# Patient Record
Sex: MALE | Race: WHITE | Employment: OTHER | URBAN - METROPOLITAN AREA
[De-identification: names, ages, dates, MRNs, and addresses within clinical notes are randomized per-mention and may not be internally consistent; named-entity substitution may affect disease eponyms.]

---

## 2022-06-13 PROBLEM — H54.7 POOR VISION: Status: ACTIVE | Noted: 2022-06-13

## 2022-06-13 PROBLEM — F17.200 CURRENT SMOKER: Status: ACTIVE | Noted: 2022-06-13

## 2022-06-13 PROBLEM — E78.5 HYPERLIPIDEMIA: Status: ACTIVE | Noted: 2022-06-13

## 2022-06-13 PROBLEM — N40.0 ENLARGED PROSTATE: Status: ACTIVE | Noted: 2022-06-13

## 2022-06-13 PROBLEM — I10 ESSENTIAL HYPERTENSION: Status: ACTIVE | Noted: 2022-06-13

## 2022-06-14 PROBLEM — R97.20 ELEVATED PSA, BETWEEN 10 AND LESS THAN 20 NG/ML: Status: ACTIVE | Noted: 2022-06-14

## 2022-06-14 PROBLEM — R97.20 ELEVATED PSA, BETWEEN 10 AND LESS THAN 20 NG/ML: Chronic | Status: ACTIVE | Noted: 2022-06-14

## 2022-09-13 PROBLEM — Z12.11 COLON CANCER SCREENING: Status: ACTIVE | Noted: 2022-09-13

## 2022-09-13 PROBLEM — H91.91 HEARING LOSS OF RIGHT EAR: Status: ACTIVE | Noted: 2022-09-13

## 2022-09-13 PROBLEM — R97.20 ELEVATED PSA: Status: ACTIVE | Noted: 2022-06-14

## 2022-09-21 PROBLEM — N40.3 NODULAR PROSTATE WITH URINARY OBSTRUCTION: Status: ACTIVE | Noted: 2022-06-13

## 2022-09-21 PROBLEM — N13.8 NODULAR PROSTATE WITH URINARY OBSTRUCTION: Status: ACTIVE | Noted: 2022-06-13

## 2022-10-13 PROBLEM — Z12.11 COLON CANCER SCREENING: Status: RESOLVED | Noted: 2022-09-13 | Resolved: 2022-10-13

## 2022-12-14 RX ORDER — CIPROFLOXACIN 500 MG/1
500 TABLET ORAL 2 TIMES DAILY
Qty: 20 TABLET | Refills: 0 | Status: SHIPPED | OUTPATIENT
Start: 2022-12-14 | End: 2022-12-24

## 2022-12-15 RX ORDER — OMEPRAZOLE 40 MG/1
40 CAPSULE, DELAYED RELEASE ORAL DAILY
COMMUNITY

## 2022-12-19 NOTE — DISCHARGE INSTRUCTIONS
Prostate Biopsy: After Care    What can I expect after a prostate biopsy? After the biopsy it is normal to experience the following sensations or symptoms:    Burning with urination - It is normal to feel burning with urination for the first 24 hours after the biopsy. It may continue for up to three days. Frequent urination - This will gradually improve over the first 24 to 36 hours. Blood in the urine - It is normal to have slightly red tinged urine or urine that resembles a jeanine or red wine color. This may last from 12 hours to 3 weeks after the biopsy. Blood in stool - You may notice red stains on the toilet tissue or see some bloody streaks in your stool. This may last for up to 5 days. Blood in the semen - this may persist for up to 6 weeks after your biopsy. MEDICATIONS:   You may have a prescription for an antibiotic. You will need to pick that up from the pharmacy and take it as directed. Typically, Dr. Melissa Rolle likes you to start taking this the day before your procedure. If you have problems tolerating the medication, please call the office to let us know. Hold any aspirin or blood thinners as directed. The surgery scheduler should have reviewed this with you when you were scheduled for your biopsy. If you have questions, give her a call at [phone]. NSAID's like ibuprofen can also cause additional bleeding. Use Tylenol only for pain. How should I care for myself after the biopsy? Drink plenty of fluids to prevent blood clots and infection in the bladder. It is important that you hydrate well. Avoid strenuous exercise such as jogging, heavy lifting, golfing, and bike riding for at least 7 days. Take your antibiotics as directed and complete the full dose given. Do not drink alcoholic beverages until after completing your antibiotics. Do not take aspirin and anti-inflammatory products such as Celebrex for 1 (one) week following prostate biopsy.   Use Tylenol for pain.  Avoid sexual activity for 7 days. When should I call my doctor? Call the office if you have any of the following signs and symptoms. Persistent urinary frequency or burning. Fever of 101 degrees or greater. Urine that is cherry-red or has clots in it. Or you are unable to urinate. Persistent heavy bleeding or clots lasting longer than 7 days.

## 2022-12-20 ENCOUNTER — HOSPITAL ENCOUNTER (OUTPATIENT)
Age: 67
Discharge: HOME | End: 2022-12-20
Attending: UROLOGY | Admitting: UROLOGY
Payer: MEDICARE

## 2022-12-20 VITALS
RESPIRATION RATE: 16 BRPM | TEMPERATURE: 98.8 F | HEIGHT: 67 IN | OXYGEN SATURATION: 96 % | BODY MASS INDEX: 25.11 KG/M2 | DIASTOLIC BLOOD PRESSURE: 61 MMHG | SYSTOLIC BLOOD PRESSURE: 114 MMHG | WEIGHT: 160 LBS | HEART RATE: 60 BPM

## 2022-12-20 PROBLEM — R97.20 ELEVATED PROSTATE SPECIFIC ANTIGEN (PSA): Status: ACTIVE | Noted: 2022-12-20

## 2022-12-20 PROCEDURE — 88305 TISSUE EXAM BY PATHOLOGIST: CPT

## 2022-12-20 PROCEDURE — 76872 US TRANSRECTAL: CPT | Performed by: UROLOGY

## 2022-12-20 PROCEDURE — 88342 IMHCHEM/IMCYTCHM 1ST ANTB: CPT

## 2022-12-20 PROCEDURE — 74011000250 HC RX REV CODE- 250: Performed by: UROLOGY

## 2022-12-20 PROCEDURE — 77030003666 HC NDL SPINAL BD -A: Performed by: UROLOGY

## 2022-12-20 PROCEDURE — 99153 MOD SED SAME PHYS/QHP EA: CPT | Performed by: UROLOGY

## 2022-12-20 PROCEDURE — 88344 IMHCHEM/IMCYTCHM EA MLT ANTB: CPT

## 2022-12-20 PROCEDURE — 55700 PR BIOPSY OF PROSTATE,NEEDLE/PUNCH: CPT | Performed by: UROLOGY

## 2022-12-20 PROCEDURE — 99152 MOD SED SAME PHYS/QHP 5/>YRS: CPT | Performed by: UROLOGY

## 2022-12-20 PROCEDURE — 2709999900 HC NON-CHARGEABLE SUPPLY: Performed by: UROLOGY

## 2022-12-20 PROCEDURE — 77030003481 HC NDL BIOP GUN BARD -B: Performed by: UROLOGY

## 2022-12-20 PROCEDURE — 76210000026 HC REC RM PH II 1 TO 1.5 HR: Performed by: UROLOGY

## 2022-12-20 PROCEDURE — 55700 PR PROSTATE NEEDLE BIOPSY ANY APPROACH: CPT | Performed by: UROLOGY

## 2022-12-20 PROCEDURE — 76010000138 HC OR TIME 0.5 TO 1 HR: Performed by: UROLOGY

## 2022-12-20 PROCEDURE — 74011250636 HC RX REV CODE- 250/636: Performed by: UROLOGY

## 2022-12-20 RX ORDER — LIDOCAINE HYDROCHLORIDE 10 MG/ML
INJECTION INFILTRATION; PERINEURAL AS NEEDED
Status: DISCONTINUED | OUTPATIENT
Start: 2022-12-20 | End: 2022-12-20 | Stop reason: HOSPADM

## 2022-12-20 RX ORDER — SODIUM CHLORIDE, SODIUM LACTATE, POTASSIUM CHLORIDE, CALCIUM CHLORIDE 600; 310; 30; 20 MG/100ML; MG/100ML; MG/100ML; MG/100ML
20 INJECTION, SOLUTION INTRAVENOUS CONTINUOUS
Status: DISCONTINUED | OUTPATIENT
Start: 2022-12-20 | End: 2022-12-20 | Stop reason: HOSPADM

## 2022-12-20 RX ORDER — MIDAZOLAM HYDROCHLORIDE 1 MG/ML
2 INJECTION, SOLUTION INTRAMUSCULAR; INTRAVENOUS ONCE
Status: COMPLETED | OUTPATIENT
Start: 2022-12-20 | End: 2022-12-20

## 2022-12-20 RX ORDER — MIDAZOLAM HYDROCHLORIDE 1 MG/ML
1 INJECTION, SOLUTION INTRAMUSCULAR; INTRAVENOUS ONCE
Status: COMPLETED | OUTPATIENT
Start: 2022-12-20 | End: 2022-12-20

## 2022-12-20 RX ORDER — FENTANYL CITRATE 50 UG/ML
100 INJECTION, SOLUTION INTRAMUSCULAR; INTRAVENOUS ONCE
Status: COMPLETED | OUTPATIENT
Start: 2022-12-20 | End: 2022-12-20

## 2022-12-20 RX ADMIN — FENTANYL CITRATE 50 MCG: 50 INJECTION INTRAMUSCULAR; INTRAVENOUS at 10:14

## 2022-12-20 RX ADMIN — MIDAZOLAM 2 MG: 1 INJECTION INTRAMUSCULAR; INTRAVENOUS at 10:02

## 2022-12-20 RX ADMIN — MIDAZOLAM 1 MG: 1 INJECTION INTRAMUSCULAR; INTRAVENOUS at 10:02

## 2022-12-20 RX ADMIN — SODIUM CHLORIDE, POTASSIUM CHLORIDE, SODIUM LACTATE AND CALCIUM CHLORIDE 20 ML/HR: 600; 310; 30; 20 INJECTION, SOLUTION INTRAVENOUS at 09:29

## 2022-12-20 NOTE — PROGRESS NOTES
DC instructions reviewed with pt and  his brother,Gerson. Both verb understanding. Pt escorted out via wheelchair to front entrance for dc home.

## 2022-12-20 NOTE — H&P
UROLOGY HISTORY AND PHYSICAL  [place], [address] [facility] [employer] Office    Patient: Mckayla Gonzáles MRN: [id number]  SSN: [id number]    YOB: 1955  Age: 78 y.o. Sex: male          Date of Encounter:  December 20, 2022  Chief Complaint:  elevated PSA and prostate nodule on MRI             History of Present Illness:  Patient is a 78 y.o. male here for surgery. Prostate nodularity bilaterally on exam.  Started on tamsulosin. He reports urgency, frequency, and weak stream.  He has intermittency and straining. Nocturia x 2. Unclear if he has incomplete emptying. He drinks 5-6 cups of coffee per day and occasional tea. He notes tea makes him urinate more so he sticks with coffee. He does not drink water but not daily. PSA was 15.9. Alk phos 155. MRI 10/16/22: The prostate measures 6.2 x 5.3 x 4.8 cm. Prostate volume is 84.9 cc. Stromal  hypertrophy is a transition zone is noted. There are 2 possible lesions in the  prostate gland. Lesion 1:  Location: Posterior peripheral zone midline prostate base. Image number: Series 6 image 16  Size: 1.6 cm  T2 score: 5  DWI score: 5  DCE: Positive  PI-RADS: 5  MARISOL:  Lesion abuts the prostate capsule. No gross evidence of extra capsular  extension. Seminal Vesicle Invasion:  Questionable extension into the right seminal  vesicle. Lesion 2:  Location: Right anterior apex transitional zone.   Image number: Series 6 image 10  Size: 1.0 cm  T2 score: 3  DWI score: 4  DCE: Positive  PI-RADS: 3  MARISOL:  No definite extracapsular extension  Seminal Vesicle Invasion:  No definite seminal vesicle invasion  See the problem list.     Problem List  Date Reviewed: 10/26/2022            Codes Class Noted    Hearing loss of right ear ICD-10-CM: H91.91  ICD-9-CM: 389.9  9/13/2022        Elevated PSA ICD-10-CM: R97.20  ICD-9-CM: 790.93  6/14/2022    Overview Signed 9/21/2022  3:29 PM by Jerson Soto MD     Component Ref Range & Units 6/13/22 0911    Prostate Specific Ag 0.0 - 4.0 ng/mL 15.9 High                    Essential hypertension ICD-10-CM: I10  ICD-9-CM: 401.9  6/13/2022        Hyperlipidemia ICD-10-CM: E78.5  ICD-9-CM: 272.4  6/13/2022        Current smoker ICD-10-CM: F17.200  ICD-9-CM: 305.1  6/13/2022        Poor vision ICD-10-CM: H54.7  ICD-9-CM: 369.9  6/13/2022    Overview Signed 9/21/2022  3:55 PM by Raul Foster MD     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 He does not drive and cannot read easily. He still watches movies / [employer] as long as the scenes are brightly lit. Nodular prostate with urinary obstruction ICD-10-CM: N40.3, N13.8  ICD-9-CM: 600.11  6/13/2022            Past Medical History:  No Known Allergies   Prior to Admission medications    Medication Sig Start Date End Date Taking? Authorizing Provider   omeprazole (PRILOSEC) 40 mg capsule Take 40 mg by mouth daily. Yes Provider, Historical   losartan (COZAAR) 25 mg tablet Take 25 mg by mouth daily. 5/31/22  Yes Provider, Historical   atorvastatin (LIPITOR) 40 mg tablet Take 40 mg by mouth daily. 4/4/22  Yes Provider, Historical   ciprofloxacin HCl (CIPRO) 500 mg tablet Take 1 Tablet by mouth two (2) times a day for 10 days. Start the day before your biopsy. 12/14/22 12/24/22  Leodan Tam NP   tamsulosin (FLOMAX) 0.4 mg capsule Take 1 Capsule by mouth daily. Patient not taking: Reported on 12/15/2022 9/21/22   Raul Foster MD      PMHx:  has a past medical history of GERD (gastroesophageal reflux disease), Heart murmur, Hypercholesterolemia, and Hypertension. PSurgHx:  has a past surgical history that includes hx hernia repair. PSocHx:  reports that he has been smoking cigarettes. He has a 10.00 pack-year smoking history. He has never used smokeless tobacco. He reports current alcohol use of about 4.0 standard drinks per week.  He reports that he does not currently use drugs after having used the following drugs: Marijuana. FamHx:   Family History   Problem Relation Age of Onset    Cancer Mother     Esophageal Cancer Mother     Breast Cancer Sister      ROS:  Admission ROS by Britton Rasmussen MD from (Not on file) were reviewed with the patient and changes (other than per HPI) include: none. Physical Exam:            Vitals[de-identified]    No data recorded. Height 5' 7\" (1.702 m), weight 160 lb (72.6 kg). Estimated body mass index is 25.06 kg/m² as calculated from the following:    Height as of this encounter: 5' 7\" (1.702 m). Weight as of this encounter: 160 lb (72.6 kg). I&O's:    No intake/output data recorded. General Well developed, in NAD   Conjunctiva/Lids Normal without gross defects   Neck Supple without obvious, masses   Respiratory Effort Breathing easily, no audible wheezing, rhonchi, stridor   CV RRR   Abdomen / Flank Soft, non tender without guarding or rebound, without obvious masses, no CVA tenderness   Neurologic Grossly normal without focal deficits           Assessment/Plan:  Elevated PSA and prostate nodules on MRI. The patient was counseled on the risks, benefits and expected course of surgery. Surgical risk factors include concurrent diagnoses and PMH. Surgery has risks of bleeding, infection, injury, pain, death or other consequences. Perioperative medications and antibiotic use were discussed including the potential for reactions and side effects. Some specific risks of surgery were discussed as well.        MR fusion guided prostate biopsy    Signed By: Britton Rasmussen MD  - December 20, 2022

## 2022-12-20 NOTE — OP NOTES
Patient: Phuc Schmidt MRN: [id number]  SSN: [id number]    YOB: 1955  Age: 78 y.o. Sex: male              UROLOGY OPERATIVE NOTE    Pre-operative Diagnosis:  Elevated PSA, prostate lesion   Post-operative Diagnosis: same    Procedure:  Prostate needle biopsy, Transrectal ultrasound, US guidance for biopsy    Surgeon: Daina Dwyer MD  Assistant: none  Anesthesia:  18min moderate sedation  Versed (mg): 3  Fentanyl (mcg): 50  Local lidocaine (cc):  6    Findings:  TRUS volume: 91 cc/ MRI volume: 85 cc  MR localized lesions  Estimated Blood Loss:    scant  Drains:  none  Specimens:   Targeted biopsy location 1, PI-RADS 5: left mid peripheral 2 cores  Targeted biopsy location 2, PI-RADS 4: right ant apex TZ 2 cores  Right lateral base  Right lateral mid  Right lateral apex  Right medial base  Right medial mid  Rigth medial apex  Left lateral base  Left lateral mid  Left lateral apex  Left medial base  Left medial mid  Left medial apex    Complications: none           Procedure Details: The patient was seen in the pre-operative area. The risks, benefits, complications, alternative treatment options, and expected outcomes were again discussed with the patient. The possibilities of reaction to medication, pain, infection, bleeding, major cardiovascular event, death, damage to surrounding structures were specifically addressed. Informed consent was then obtained. Upon arrival to the operative suite, the patient, procedure, and side were confirmed via a pre-operative \"time-out\". All were in agreement. The patient was carefully positioned in the left lateral position and sedation was undertaken. Digital rectal exam was performed. No palpable nodules were identified. The transrectal ultrasound probe was introduced. Multiplanar echography was performed. 6 cc of 1% lidocaine was infiltrated at the right prostate apex and base and left prostate apex and base.      The prostate contours were mapped and fused with the MRI images. Fused ultrasound imaging was used to target the suspicious areas. Using a 18-gauge core biopsy needle, the MRI lesions were biopsied. Multiple cores, at least 3, were obtained at the suspicious lesions. Adequate tissue was obtained. Standard 12 zone prostate sampling was performed as well. Single cores were obtained unless otherwise specified. The ultrasound was reviewed. Hemostasis was satisfactory. The patient was transported in stable condition to recovery.     Leidy Haque MD

## 2022-12-21 ENCOUNTER — TELEPHONE (OUTPATIENT)
Dept: UROLOGY | Age: 67
End: 2022-12-21

## 2022-12-21 DIAGNOSIS — R97.20 ELEVATED PSA: Primary | ICD-10-CM

## 2022-12-21 RX ORDER — SULFAMETHOXAZOLE AND TRIMETHOPRIM 800; 160 MG/1; MG/1
1 TABLET ORAL 2 TIMES DAILY
Qty: 10 TABLET | Refills: 0 | Status: SHIPPED | OUTPATIENT
Start: 2022-12-21 | End: 2022-12-26

## 2022-12-24 ENCOUNTER — TELEPHONE (OUTPATIENT)
Dept: UROLOGY | Age: 67
End: 2022-12-24

## 2022-12-24 NOTE — TELEPHONE ENCOUNTER
Patient called and reported that he is unable to have a BM since the prostate biopsy on 12/20/22  He denied N/V, abdominal pain,  He was advised to eat soft diet, fruits and vegetables, drink a lot of water, take Doculax or Miralx. He will call back if above interventions will not help.

## 2023-01-04 ENCOUNTER — OFFICE VISIT (OUTPATIENT)
Dept: UROLOGY | Age: 68
End: 2023-01-04
Payer: MEDICARE

## 2023-01-04 VITALS — WEIGHT: 160 LBS | BODY MASS INDEX: 25.11 KG/M2 | HEIGHT: 67 IN

## 2023-01-04 DIAGNOSIS — N13.8 NODULAR PROSTATE WITH URINARY OBSTRUCTION: ICD-10-CM

## 2023-01-04 DIAGNOSIS — R97.20 ELEVATED PSA: ICD-10-CM

## 2023-01-04 DIAGNOSIS — N40.3 NODULAR PROSTATE WITH URINARY OBSTRUCTION: ICD-10-CM

## 2023-01-04 DIAGNOSIS — C61 PROSTATE CANCER (HCC): ICD-10-CM

## 2023-01-04 PROCEDURE — G8536 NO DOC ELDER MAL SCRN: HCPCS | Performed by: UROLOGY

## 2023-01-04 PROCEDURE — G8427 DOCREV CUR MEDS BY ELIG CLIN: HCPCS | Performed by: UROLOGY

## 2023-01-04 PROCEDURE — 99214 OFFICE O/P EST MOD 30 MIN: CPT | Performed by: UROLOGY

## 2023-01-04 PROCEDURE — 3017F COLORECTAL CA SCREEN DOC REV: CPT | Performed by: UROLOGY

## 2023-01-04 PROCEDURE — 1123F ACP DISCUSS/DSCN MKR DOCD: CPT | Performed by: UROLOGY

## 2023-01-04 PROCEDURE — G8417 CALC BMI ABV UP PARAM F/U: HCPCS | Performed by: UROLOGY

## 2023-01-04 PROCEDURE — 1101F PT FALLS ASSESS-DOCD LE1/YR: CPT | Performed by: UROLOGY

## 2023-01-04 PROCEDURE — G8432 DEP SCR NOT DOC, RNG: HCPCS | Performed by: UROLOGY

## 2023-01-04 NOTE — PROGRESS NOTES
HISTORY OF PRESENT ILLNESS  Loyda Mann is a 78 y.o. male. has a past medical history of GERD (gastroesophageal reflux disease), Heart murmur, Hypercholesterolemia, Hypertension, and Retina degeneration. has a past surgical history that includes hx hernia repair. Chief Complaint   Patient presents with    Post OP Follow Up    Biopsy Results     He is status post a prostate biopsy 12/20/2022 for PSA 15.9. MRI revealed several lesions. He underwent a biopsy which revealed Dasha's 3+3 disease in 1 of 14 cores. He also had atypia. Chronic Conditions Addressed Today       1. Nodular prostate with urinary obstruction     Overview      TRUS volume: 91 cc/ MRI volume: 85 cc in 2022          Current Assessment & Plan       Prostate enlargement without bothersome symptoms. 2. Elevated PSA     Overview      Component Ref Range & Units 6/13/22 0911    Prostate Specific Ag 0.0 - 4.0 ng/mL 15.9 High                 Current Assessment & Plan       He has a PSA 15.9 and underwent a MR fusion guided prostate biopsy. Biopsy results do not completely correlate to the elevation. Relevant Orders     PSA, DIAGNOSTIC (PROSTATE SPECIFIC AG)    3. Prostate cancer ([facility] [address]     Overview      PSA 15.9, status post MR fusion guided prostate biopsy 12/20/2022.  1 of 14 zones were positive for the [place]'s 3+3 disease and less than 5% of the tissue. Current Assessment & Plan      He is diagnosed with prostate cancer on biopsy 12/20/2022. PSA was 15.9. Dasha's 3+3 disease in 1 of 14 zones, less than 5% indicating low risk disease. I recommended decipher testing to confirm genetically low risk disease. His markedly elevated PSA does not correlate to the prostate cancer burden. This can make observation more confusing. Options include active surveillance, surgery, radiation, androgen deprivation, or other uncommon treatments. The patient was offered referral to radiation oncology.     We specifically discussed robotic prostatectomy with pelvic lymph node dissection. The risks include standard risks of anesthesia and surgery including bleeding, clots, injury, infection, death or other. The specific risks include incontinence, impotence, swelling or edema. The patient had the opportunity to ask questions. He is concerned about going through treatment. Plan on observation with PSA in 3m. Past Medical History:    PMHx (including negatives):  has a past medical history of GERD (gastroesophageal reflux disease), Heart murmur, Hypercholesterolemia, Hypertension, and Retina degeneration. PSurgHx:  has a past surgical history that includes hx hernia repair. PSocHx:  reports that he has been smoking cigarettes. He has a 10.00 pack-year smoking history. He has never used smokeless tobacco. He reports current alcohol use of about 4.0 standard drinks per week. He reports that he does not currently use drugs after having used the following drugs: Marijuana. FamilyHX:   Family History   Problem Relation Age of Onset    Cancer Mother     Esophageal Cancer Mother     Breast Cancer Sister      ROS  Physical Exam  No Known Allergies  Current Outpatient Medications   Medication Sig Dispense Refill    omeprazole (PRILOSEC) 40 mg capsule Take 40 mg by mouth daily. losartan (COZAAR) 25 mg tablet Take 25 mg by mouth daily. atorvastatin (LIPITOR) 40 mg tablet Take 40 mg by mouth daily.         Results for orders placed or performed in visit on 09/21/22   PSA, TOTAL &  FREE   Result Value Ref Range    Prostate Specific Ag 17.2 (H) 0.0 - 4.0 ng/mL    PSA, Free 3.83 N/A ng/mL    PSA, % Free 22.3 %   AMB POC URINALYSIS DIP STICK AUTO W/O MICRO   Result Value Ref Range    Color (UA POC) Yellow     Clarity (UA POC) Clear     Glucose (UA POC) Negative Negative mg/dL    Bilirubin (UA POC) Negative Negative    Ketones (UA POC) Negative Negative    Specific gravity (UA POC) 1.020 1.001 - 1.035 Blood (UA POC) Negative Negative    pH (UA POC) 6.5 4.6 - 8.0    Protein (UA POC) Negative Negative    Urobilinogen (UA POC) 0.2 mg/dL 0.2 - 1    Nitrites (UA POC) Negative Negative    Leukocyte esterase (UA POC) Negative Negative       ASSESSMENT and PLAN  Diagnoses and all orders for this visit:    1. Prostate cancer [facility])  Assessment & Plan:  He is diagnosed with prostate cancer on biopsy 12/20/2022. PSA was 15.9. [place]'s 3+3 disease in 1 of 14 zones, less than 5% indicating low risk disease. I recommended decipher testing to confirm genetically low risk disease. His markedly elevated PSA does not correlate to the prostate cancer burden. This can make observation more confusing. Options include active surveillance, surgery, radiation, androgen deprivation, or other uncommon treatments. The patient was offered referral to radiation oncology. We specifically discussed robotic prostatectomy with pelvic lymph node dissection. The risks include standard risks of anesthesia and surgery including bleeding, clots, injury, infection, death or other. The specific risks include incontinence, impotence, swelling or edema. The patient had the opportunity to ask questions. He is concerned about going through treatment. Plan on observation with PSA in 3m. 2. Elevated PSA  Assessment & Plan:   He has a PSA 15.9 and underwent a MR fusion guided prostate biopsy. Biopsy results do not completely correlate to the elevation. Orders:  -     PSA, DIAGNOSTIC (PROSTATE SPECIFIC AG); Future    3. Nodular prostate with urinary obstruction  Assessment & Plan:   Prostate enlargement without bothersome symptoms. Follow-up and Dispositions    Return in about 3 months (around 4/4/2023) for PSA prior.        Leonel Spann MD

## 2023-01-04 NOTE — LETTER
1/4/2023    Patient: Ever Wall   YOB: 1955   Date of Visit: 1/4/2023     Brenda Manrique MD  [place] [id number]  [address] [facility] [address]    Dear Brenda Manrique MD,      Thank you for referring Mr. Kaur Found to [facility] for evaluation. My notes for this consultation are attached. If you have questions, please do not hesitate to call me. I look forward to following your patient along with you.       Sincerely,    Dillon Greenberg MD

## 2023-01-04 NOTE — ASSESSMENT & PLAN NOTE
He is diagnosed with prostate cancer on biopsy 12/20/2022. PSA was 15.9. [place]'s 3+3 disease in 1 of 14 zones, less than 5% indicating low risk disease. I recommended decipher testing to confirm genetically low risk disease. His markedly elevated PSA does not correlate to the prostate cancer burden. This can make observation more confusing. Options include active surveillance, surgery, radiation, androgen deprivation, or other uncommon treatments. The patient was offered referral to radiation oncology. We specifically discussed robotic prostatectomy with pelvic lymph node dissection. The risks include standard risks of anesthesia and surgery including bleeding, clots, injury, infection, death or other. The specific risks include incontinence, impotence, swelling or edema. The patient had the opportunity to ask questions. He is concerned about going through treatment. Plan on observation with PSA in 3m.

## 2023-01-04 NOTE — ASSESSMENT & PLAN NOTE
He has a PSA 15.9 and underwent a MR fusion guided prostate biopsy. Biopsy results do not completely correlate to the elevation.

## 2023-01-04 NOTE — PROGRESS NOTES
Chief Complaint   Patient presents with    Post OP Follow Up    Biopsy Results     1. Have you been to the ER, urgent care clinic since your last visit? Hospitalized since your last visit? No    2. Have you seen or consulted any other health care providers outside of the [address] [place] [address] since your last visit? Include any pap smears or colon screening.  No  Visit Vitals  Ht 5' 7\" (1.702 m)   Wt 160 lb (72.6 kg)   BMI 25.06 kg/m²

## 2023-04-03 ENCOUNTER — TELEPHONE (OUTPATIENT)
Dept: UROLOGY | Age: 68
End: 2023-04-03

## 2023-04-05 ENCOUNTER — OFFICE VISIT (OUTPATIENT)
Dept: UROLOGY | Age: 68
End: 2023-04-05
Payer: MEDICARE

## 2023-04-05 ENCOUNTER — APPOINTMENT (OUTPATIENT)
Dept: GENERAL RADIOLOGY | Age: 68
End: 2023-04-05
Attending: EMERGENCY MEDICINE
Payer: MEDICARE

## 2023-04-05 ENCOUNTER — HOSPITAL ENCOUNTER (OUTPATIENT)
Age: 68
End: 2023-04-05
Attending: EMERGENCY MEDICINE
Payer: MEDICARE

## 2023-04-05 DIAGNOSIS — R97.20 ELEVATED PSA: ICD-10-CM

## 2023-04-05 DIAGNOSIS — C61 PROSTATE CANCER (HCC): Primary | ICD-10-CM

## 2023-04-05 PROCEDURE — 1101F PT FALLS ASSESS-DOCD LE1/YR: CPT | Performed by: UROLOGY

## 2023-04-05 PROCEDURE — 99283 EMERGENCY DEPT VISIT LOW MDM: CPT

## 2023-04-05 PROCEDURE — 1123F ACP DISCUSS/DSCN MKR DOCD: CPT | Performed by: UROLOGY

## 2023-04-05 PROCEDURE — G8536 NO DOC ELDER MAL SCRN: HCPCS | Performed by: UROLOGY

## 2023-04-05 PROCEDURE — G8417 CALC BMI ABV UP PARAM F/U: HCPCS | Performed by: UROLOGY

## 2023-04-05 PROCEDURE — 73030 X-RAY EXAM OF SHOULDER: CPT

## 2023-04-05 PROCEDURE — G8427 DOCREV CUR MEDS BY ELIG CLIN: HCPCS | Performed by: UROLOGY

## 2023-04-05 PROCEDURE — 99214 OFFICE O/P EST MOD 30 MIN: CPT | Performed by: UROLOGY

## 2023-04-05 PROCEDURE — 3017F COLORECTAL CA SCREEN DOC REV: CPT | Performed by: UROLOGY

## 2023-04-05 PROCEDURE — G8432 DEP SCR NOT DOC, RNG: HCPCS | Performed by: UROLOGY

## 2023-04-05 RX ORDER — ACETAMINOPHEN 500 MG
1000 TABLET ORAL
Status: DISCONTINUED
Start: 2023-04-05 | End: 2023-04-05 | Stop reason: HOSPADM

## 2023-04-05 RX ORDER — LIDOCAINE 50 MG/G
PATCH TOPICAL
Qty: 15 EACH | Refills: 0 | Status: SHIPPED
Start: 2023-04-05

## 2023-04-05 RX ORDER — NAPROXEN 500 MG/1
500 TABLET ORAL 2 TIMES DAILY WITH MEALS
Qty: 20 TABLET | Refills: 0 | Status: SHIPPED
Start: 2023-04-05 | End: 2023-04-15

## 2023-04-05 RX ORDER — LIDOCAINE 4 G/100G
3 PATCH TOPICAL
Status: DISCONTINUED
Start: 2023-04-05 | End: 2023-04-05 | Stop reason: HOSPADM

## 2023-04-05 NOTE — PROGRESS NOTES
Chief Complaint   Patient presents with    Follow-up    Prostate Cancer    Elevated PSA    Prostate Nodule       PHQ-9 score is    Negative    Vitals:    04/05/23 1358   Weight: 160 lb (72.6 kg)   Height: 5' 6\" (1.676 m)   PainSc:   0 - No pain      1. \"Have you been to the ER, urgent care clinic since your last visit? Hospitalized since your last visit? \" No    2. \"Have you seen or consulted any other health care providers outside of the [address] [place] [address] since your last visit? \" No     3. For patients aged 39-70: Has the patient had a colonoscopy / FIT/ Cologuard? No      If the patient is female:    4. For patients aged 41-77: Has the patient had a mammogram within the past 2 years? No      5. For patients aged 21-65: Has the patient had a pap smear?  No

## 2023-04-05 NOTE — PROGRESS NOTES
HISTORY OF PRESENT ILLNESS  Jessika Marie is a 80 y.o. male. has a past medical history of GERD (gastroesophageal reflux disease), Heart murmur, Hypercholesterolemia, Hypertension, and Retina degeneration. has a past surgical history that includes hx hernia repair and pr prostate biopsy, needle, saturation sampling (12/20/2022). Chief Complaint   Patient presents with    Follow-up    Prostate Cancer    Elevated PSA    Prostate Nodule     He has Welsh's 6 disease. PSA is due. The patient denies a weak urinary stream, sense of incomplete emptying, straining or hesitancy. The patient denies urinary frequency, urgency, dysuria or hematuria. Chronic Conditions Addressed Today       1. Nodular prostate with urinary obstruction     Overview      TRUS volume: 91 cc/ MRI volume: 85 cc in 2022          Current Assessment & Plan      No bothersome LUTS now. 2. Elevated PSA     Overview      Component Ref Range & Units 9/21/22 1555 6/13/22 0911   Prostate Specific Ag 0.0 - 4.0 ng/mL 17.2 High   15.9 High  CM                  Current Assessment & Plan      Observe PSA trends. Relevant Orders     PSA, DIAGNOSTIC (PROSTATE SPECIFIC AG)     PSA, DIAGNOSTIC (PROSTATE SPECIFIC AG)    3. Prostate cancer ([facility] [address] - Primary     Overview      PSA 15.9, status post MR fusion guided prostate biopsy 12/20/2022.  1 of 14 zones were positive for the [place]'s 3+3 disease and less than 5% of the tissue. Current Assessment & Plan      H/o elevated PSA. MR fusion biopsy 12/20/22 with 1/14 Gl 6 disease. Observation. PSA now. Relevant Orders     PSA, DIAGNOSTIC (PROSTATE SPECIFIC AG)     PSA, DIAGNOSTIC (PROSTATE SPECIFIC AG)       Past Medical History:    PMHx (including negatives):  has a past medical history of GERD (gastroesophageal reflux disease), Heart murmur, Hypercholesterolemia, Hypertension, and Retina degeneration.    PSurgHx:  has a past surgical history that includes hx hernia repair and pr prostate biopsy, needle, saturation sampling (12/20/2022). PSocHx:  reports that he has been smoking cigarettes. He has a 10.00 pack-year smoking history. He has never used smokeless tobacco. He reports current alcohol use of about 4.0 standard drinks per week. He reports that he does not currently use drugs after having used the following drugs: Marijuana. FamilyHX:   Family History   Problem Relation Age of Onset    Cancer Mother     Esophageal Cancer Mother     Breast Cancer Sister      ROS  Physical Exam  No Known Allergies  Current Outpatient Medications   Medication Sig Dispense Refill    omeprazole (PRILOSEC) 40 mg capsule Take 1 Capsule by mouth daily. losartan (COZAAR) 25 mg tablet Take 1 Tablet by mouth daily. atorvastatin (LIPITOR) 40 mg tablet Take 1 Tablet by mouth daily. Results for orders placed or performed in visit on 09/21/22   PSA, TOTAL &  FREE   Result Value Ref Range    Prostate Specific Ag 17.2 (H) 0.0 - 4.0 ng/mL    PSA, Free 3.83 N/A ng/mL    PSA, % Free 22.3 %   AMB POC URINALYSIS DIP STICK AUTO W/O MICRO   Result Value Ref Range    Color (UA POC) Yellow     Clarity (UA POC) Clear     Glucose (UA POC) Negative Negative mg/dL    Bilirubin (UA POC) Negative Negative    Ketones (UA POC) Negative Negative    Specific gravity (UA POC) 1.020 1.001 - 1.035    Blood (UA POC) Negative Negative    pH (UA POC) 6.5 4.6 - 8.0    Protein (UA POC) Negative Negative    Urobilinogen (UA POC) 0.2 mg/dL 0.2 - 1    Nitrites (UA POC) Negative Negative    Leukocyte esterase (UA POC) Negative Negative       ASSESSMENT and PLAN  Diagnoses and all orders for this visit:    1. Prostate cancer [facility])  Assessment & Plan:  H/o elevated PSA. MR fusion biopsy 12/20/22 with 1/14 Gl 6 disease. Observation. PSA now. Orders:  -     PSA, DIAGNOSTIC (PROSTATE SPECIFIC AG)  -     PSA, DIAGNOSTIC (PROSTATE SPECIFIC AG); Future    2. Elevated PSA  Assessment & Plan:  Observe PSA trends. Orders:  -     PSA, DIAGNOSTIC (PROSTATE SPECIFIC AG)  -     PSA, DIAGNOSTIC (PROSTATE SPECIFIC AG); Future    3. Nodular prostate with urinary obstruction  Assessment & Plan:  No bothersome LUTS now. Follow-up and Dispositions    Return in about 3 months (around 7/5/2023) for Follow up after testing. Jose Hernandez MD       Please note that portions of this note was potentially completed with Dragon dictation, the computer voice recognition software. Quite often unanticipated grammatical, syntax, homophones, and other interpretive errors are inadvertently transcribed by the computer software. Please disregard these errors. Please excuse any errors that have escaped final proofreading. Thank you.

## 2023-04-05 NOTE — ED TRIAGE NOTES
Patient reports left shoulder pain with difficulty raising it after falling out of a chair today and landing on it. Denies, hitting head, LOC or blood thinner use.

## 2023-04-05 NOTE — LETTER
4/5/2023    Patient: Bijan Holcomb   YOB: 1955   Date of Visit: 4/5/2023     Moi Segundo MD  [place] [id number]  [address] [facility] [address]    Dear Moi Segundo MD,      Thank you for referring Mr. Anabelle Cosby to [email or web address] for evaluation. My notes for this consultation are attached. If you have questions, please do not hesitate to call me. I look forward to following your patient along with you.       Sincerely,    Josue Truong MD

## 2023-04-06 NOTE — ED PROVIDER NOTES
72-year-old male with past medical history of GERD, heart murmur, elevated cholesterol, hypertension, retinal degeneration presents ambulatory for evaluation of left shoulder pain. Patient states that he was sitting in a chair earlier today, propped his feet up leaned back and tipped out of the chair, patient states that he landed directly on his left shoulder, denies hitting his head, loss of consciousness. Patient denies feeling dizzy or lightheaded currently, denies numbness or tingling, states that he is having difficulty moving his left arm at this time, no medications taken prior to arrival.  Patient states he takes a baby aspirin only. Patient states that he injured his left shoulder as a young child. 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 up by parasites somehow\"       Past Surgical History:   Procedure Laterality Date    HX HERNIA REPAIR      [facility] PROSTATE BIOPSY, NEEDLE, SATURATION SAMPLING  12/20/2022         Family History:   Problem Relation Age of Onset    Cancer Mother     Esophageal Cancer Mother     Breast Cancer Sister        Social History     Socioeconomic History    Marital status: SINGLE     Spouse name: Not on file    Number of children: Not on file    Years of education: Not on file    Highest education level: Not on file   Occupational History    Not on file   Tobacco Use    Smoking status: Every Day     Packs/day: 0.50     Years: 20.00     Pack years: 10.00     Types: Cigarettes    Smokeless tobacco: Never    Tobacco comments:     5-6 a day   Vaping Use    Vaping Use: Never used   Substance and Sexual Activity    Alcohol use:  Yes     Alcohol/week: 4.0 standard drinks     Types: 4 Cans of beer per week     Comment: occassionally    Drug use: Not Currently     Types: Marijuana     Comment: Marijuana 30 years ago Sexual activity: Not on file   Other Topics Concern    Not on file   Social History Narrative    Not on file     Social Determinants of Health     Financial Resource Strain: Not on file   Food Insecurity: Not on file   Transportation Needs: Not on file   Physical Activity: Not on file   Stress: Not on file   Social Connections: Not on file   Intimate Partner Violence: Not on file   Housing Stability: Not on file         ALLERGIES: Patient has no known allergies. Review of Systems   Constitutional: Negative. Respiratory: Negative. Cardiovascular: Negative. Gastrointestinal: Negative. Genitourinary: Negative. Musculoskeletal:  Positive for myalgias. Pain to the left shoulder. Neurological: Negative. Vitals:    04/05/23 1900   BP: (!) 153/77   Pulse: 79   Resp: 18   Temp: 98.2 °F (36.8 °C)   SpO2: 97%   Weight: 72.6 kg (160 lb)   Height: 5' 6\" (1.676 m)            Physical Exam  Vitals and nursing note reviewed. Constitutional:       General: He is not in acute distress. Appearance: Normal appearance. He is normal weight. He is not ill-appearing. HENT:      Head: Normocephalic. Nose: Nose normal.   Cardiovascular:      Rate and Rhythm: Normal rate. Pulses: Normal pulses. Pulmonary:      Effort: Pulmonary effort is normal. No respiratory distress. Abdominal:      General: There is no distension. Musculoskeletal:         General: No swelling. Right shoulder: Normal.      Left shoulder: Tenderness and bony tenderness present. No swelling, deformity or crepitus. Decreased range of motion. Normal pulse. Cervical back: Normal range of motion. Comments: Strong palpable left 2+ radial pulse, capillary refill less than 2 seconds, strength hand 5 out of 5, limited range of motion due to stiffness. Skin:     General: Skin is warm and dry. Capillary Refill: Capillary refill takes less than 2 seconds.    Neurological:      Mental Status: He is alert and oriented to person, place, and time. Psychiatric:         Mood and Affect: Mood normal.         Thought Content: Thought content normal.        Medical Decision Making  Differential dx: shoulder dislocation vs humerus fracture   1. Previous notes (external to Emergency room) were reviewed: chart review    2. History was obtained by: patient    3. Chronic medical issues affecting this visit:   none    4. I ordered the following tests, followed by review of final reads by lab and radiology: xray left shoulder    5. I considered ordering: none    6. Medical intervention: naprosyn, lidocaine patch      Surgical intervention: none indicated    7. Social determinants of health: none    8 Final diagnosis: acute pain of left shoulder. Medications prescribed: naprosyn, lidocaine patch    9. Disposition: Discharge to home and follow up with PCP, return to the emergency room with worsening symptoms. Patient in agreement with plan of care. Patient reevaluated, remains neurovascularly intact, discussed x-ray results with patient. X-ray left shoulder showing no acute bony abnormality. Amount and/or Complexity of Data Reviewed  Radiology: ordered. Risk  OTC drugs. Prescription drug management. , Discussed following up with orthopedics and PCP       Procedures  VITAL SIGNS:  Patient Vitals for the past 4 hrs:   Temp Pulse Resp BP SpO2   04/05/23 1900 98.2 °F (36.8 °C) 79 18 (!) 153/77 97 %           LABS:  The Following labs have been ordered while in the emergency department and I have independently evaluated them. No results found for this or any previous visit (from the past 6 hour(s)). IMAGING:  The Following imaging studies have been ordered while in the emergency department and I have reviewed them. XR SHOULDER LT AP/LAT MIN 2 V   Final Result   No acute bony abnormality.             Medications During Visit:  I ordered/approved the ordering of the following medicines for the patient while in the emergency department. Medications   lidocaine 4 % patch 3 Patch (has no administration in time range)   acetaminophen (TYLENOL) tablet 1,000 mg (1,000 mg Oral Refused 4/5/23 1933)       IMPRESSION:  1. Acute pain of left shoulder        DISPOSITION:  Discharged      Discharge Medication List as of 4/5/2023  8:46 PM        START taking these medications    Details   naproxen (Naprosyn) 500 mg tablet Take 1 Tablet by mouth two (2) times daily (with meals) for 10 days. , Normal, Disp-20 Tablet, R-0      lidocaine (LIDODERM) 5 % Apply patch to the affected area for 12 hours a day and remove for 12 hours a day., Normal, Disp-15 Each, R-0           CONTINUE these medications which have NOT CHANGED    Details   omeprazole (PRILOSEC) 40 mg capsule Take 1 Capsule by mouth daily. , Historical Med      losartan (COZAAR) 25 mg tablet Take 1 Tablet by mouth daily. , Historical Med      atorvastatin (LIPITOR) 40 mg tablet Take 1 Tablet by mouth daily. , Historical Med              Follow-up Information       Follow up With Specialties Details Why Contact Info    Stefany Gallagher MD [facility] Schedule an appointment as soon as possible for a visit in 3 days  [address] [facility]  [phone]      [facility]  Schedule an appointment as soon as possible for a visit   [address] [id number] [phone]    [facility] EMERGENCY DEPT Emergency Medicine  If symptoms worsen [address]  [phone]              The patient is asked to follow-up with their primary care provider in the next several days. They are to call tomorrow for an appointment. The patient is asked to return promptly for any increased concerns or worsening of symptoms. They can return to this emergency department or any other emergency department.     Chelsea Delgado NP  9:55 PM

## 2023-04-23 DIAGNOSIS — C61 PROSTATE CANCER (HCC): ICD-10-CM

## 2023-04-23 DIAGNOSIS — R97.20 ELEVATED PSA: Primary | ICD-10-CM

## 2023-06-30 ENCOUNTER — TELEPHONE (OUTPATIENT)
Age: 68
End: 2023-06-30

## 2023-07-03 PROBLEM — R97.20 ELEVATED PSA: Status: ACTIVE | Noted: 2022-06-14

## 2023-07-03 PROBLEM — C61 PROSTATE CANCER (HCC): Status: ACTIVE | Noted: 2023-01-04

## 2023-07-05 DIAGNOSIS — C61 PROSTATE CANCER (HCC): ICD-10-CM

## 2023-07-05 DIAGNOSIS — R97.20 ELEVATED PSA: ICD-10-CM

## 2023-07-19 ENCOUNTER — OFFICE VISIT (OUTPATIENT)
Age: 68
End: 2023-07-19
Payer: MEDICARE

## 2023-07-19 VITALS
HEIGHT: 67 IN | SYSTOLIC BLOOD PRESSURE: 118 MMHG | DIASTOLIC BLOOD PRESSURE: 72 MMHG | TEMPERATURE: 97.8 F | HEART RATE: 71 BPM | WEIGHT: 160 LBS | OXYGEN SATURATION: 100 % | BODY MASS INDEX: 25.11 KG/M2

## 2023-07-19 DIAGNOSIS — R97.20 ELEVATED PSA: ICD-10-CM

## 2023-07-19 DIAGNOSIS — C61 PROSTATE CANCER (HCC): Primary | ICD-10-CM

## 2023-07-19 DIAGNOSIS — C61 PROSTATE CANCER (HCC): ICD-10-CM

## 2023-07-19 PROCEDURE — 1123F ACP DISCUSS/DSCN MKR DOCD: CPT | Performed by: UROLOGY

## 2023-07-19 PROCEDURE — G8427 DOCREV CUR MEDS BY ELIG CLIN: HCPCS | Performed by: UROLOGY

## 2023-07-19 PROCEDURE — 3017F COLORECTAL CA SCREEN DOC REV: CPT | Performed by: UROLOGY

## 2023-07-19 PROCEDURE — 3078F DIAST BP <80 MM HG: CPT | Performed by: UROLOGY

## 2023-07-19 PROCEDURE — 4004F PT TOBACCO SCREEN RCVD TLK: CPT | Performed by: UROLOGY

## 2023-07-19 PROCEDURE — 99214 OFFICE O/P EST MOD 30 MIN: CPT | Performed by: UROLOGY

## 2023-07-19 PROCEDURE — 3074F SYST BP LT 130 MM HG: CPT | Performed by: UROLOGY

## 2023-07-19 PROCEDURE — G8419 CALC BMI OUT NRM PARAM NOF/U: HCPCS | Performed by: UROLOGY

## 2023-07-19 NOTE — ASSESSMENT & PLAN NOTE
H/o BPH and prostate cancer. Low risk but elevated PSA. Monitor PSA. Consider repeat MRI and biopsy at 1year timeframe.

## 2023-07-20 LAB — PSA SERPL-MCNC: 14.7 NG/ML (ref 0–4)

## 2023-09-27 ENCOUNTER — TELEPHONE (OUTPATIENT)
Age: 68
End: 2023-09-27

## 2023-09-27 DIAGNOSIS — C61 PROSTATE CANCER (HCC): Primary | ICD-10-CM

## 2023-09-27 NOTE — TELEPHONE ENCOUNTER
Patient is un clear of Dr Viktor Javier. He has a appt in January but lora note says 3 month f/u which would be October. He also has questions a bout a repeat MRI. Could you review chart and call patient back to answer his clinical questions.

## 2023-09-27 NOTE — TELEPHONE ENCOUNTER
PSA in 3 months time (end of October). Consider MRI at the 1 year willy, which would be around (10/16/23). Pt should have a 3 month follow up with KEYSHA Rondon prior. He will decide based on the PSA (which came down last time) if MRI is necessary at that time. PSA order is in. [facility] is fine for location. Can be a VV too.

## 2023-09-28 ENCOUNTER — TELEPHONE (OUTPATIENT)
Age: 68
End: 2023-09-28

## 2023-10-02 DIAGNOSIS — C61 PROSTATE CANCER (HCC): ICD-10-CM

## 2023-10-04 ENCOUNTER — OFFICE VISIT (OUTPATIENT)
Dept: PRIMARY CARE CLINIC | Facility: CLINIC | Age: 68
End: 2023-10-04
Payer: MEDICARE

## 2023-10-04 VITALS
WEIGHT: 161.6 LBS | HEART RATE: 83 BPM | BODY MASS INDEX: 25.97 KG/M2 | TEMPERATURE: 98.2 F | DIASTOLIC BLOOD PRESSURE: 69 MMHG | HEIGHT: 66 IN | SYSTOLIC BLOOD PRESSURE: 131 MMHG

## 2023-10-04 DIAGNOSIS — R42 DIZZINESS: Primary | ICD-10-CM

## 2023-10-04 DIAGNOSIS — I10 ESSENTIAL HYPERTENSION: ICD-10-CM

## 2023-10-04 PROCEDURE — G8419 CALC BMI OUT NRM PARAM NOF/U: HCPCS | Performed by: FAMILY MEDICINE

## 2023-10-04 PROCEDURE — 1123F ACP DISCUSS/DSCN MKR DOCD: CPT | Performed by: FAMILY MEDICINE

## 2023-10-04 PROCEDURE — 93000 ELECTROCARDIOGRAM COMPLETE: CPT | Performed by: FAMILY MEDICINE

## 2023-10-04 PROCEDURE — 3017F COLORECTAL CA SCREEN DOC REV: CPT | Performed by: FAMILY MEDICINE

## 2023-10-04 PROCEDURE — 3078F DIAST BP <80 MM HG: CPT | Performed by: FAMILY MEDICINE

## 2023-10-04 PROCEDURE — G8484 FLU IMMUNIZE NO ADMIN: HCPCS | Performed by: FAMILY MEDICINE

## 2023-10-04 PROCEDURE — 99214 OFFICE O/P EST MOD 30 MIN: CPT | Performed by: FAMILY MEDICINE

## 2023-10-04 PROCEDURE — 4004F PT TOBACCO SCREEN RCVD TLK: CPT | Performed by: FAMILY MEDICINE

## 2023-10-04 PROCEDURE — G8427 DOCREV CUR MEDS BY ELIG CLIN: HCPCS | Performed by: FAMILY MEDICINE

## 2023-10-04 PROCEDURE — 3075F SYST BP GE 130 - 139MM HG: CPT | Performed by: FAMILY MEDICINE

## 2023-10-04 RX ORDER — MECLIZINE HYDROCHLORIDE 25 MG/1
25 TABLET ORAL 3 TIMES DAILY PRN
Qty: 15 TABLET | Refills: 0 | Status: SHIPPED | OUTPATIENT
Start: 2023-10-04 | End: 2023-10-14

## 2023-10-04 SDOH — ECONOMIC STABILITY: FOOD INSECURITY: WITHIN THE PAST 12 MONTHS, THE FOOD YOU BOUGHT JUST DIDN'T LAST AND YOU DIDN'T HAVE MONEY TO GET MORE.: NEVER TRUE

## 2023-10-04 SDOH — ECONOMIC STABILITY: INCOME INSECURITY: HOW HARD IS IT FOR YOU TO PAY FOR THE VERY BASICS LIKE FOOD, HOUSING, MEDICAL CARE, AND HEATING?: NOT HARD AT ALL

## 2023-10-04 SDOH — ECONOMIC STABILITY: HOUSING INSECURITY
IN THE LAST 12 MONTHS, WAS THERE A TIME WHEN YOU DID NOT HAVE A STEADY PLACE TO SLEEP OR SLEPT IN A SHELTER (INCLUDING NOW)?: NO

## 2023-10-04 SDOH — ECONOMIC STABILITY: FOOD INSECURITY: WITHIN THE PAST 12 MONTHS, YOU WORRIED THAT YOUR FOOD WOULD RUN OUT BEFORE YOU GOT MONEY TO BUY MORE.: NEVER TRUE

## 2023-10-04 ASSESSMENT — PATIENT HEALTH QUESTIONNAIRE - PHQ9
SUM OF ALL RESPONSES TO PHQ QUESTIONS 1-9: 0
SUM OF ALL RESPONSES TO PHQ9 QUESTIONS 1 & 2: 0
2. FEELING DOWN, DEPRESSED OR HOPELESS: 0
SUM OF ALL RESPONSES TO PHQ QUESTIONS 1-9: 0
1. LITTLE INTEREST OR PLEASURE IN DOING THINGS: 0
SUM OF ALL RESPONSES TO PHQ QUESTIONS 1-9: 0
SUM OF ALL RESPONSES TO PHQ QUESTIONS 1-9: 0

## 2023-10-04 NOTE — PROGRESS NOTES
Identified Patient with two Patient identifiers(name and ). 1. Have you been to the ER, urgent care clinic since your last visit? Hospitalized since your last visit? No    2. Have you seen or consulted any other health care providers outside of the [address] [place] [address] since your last visit? No     3. For patients aged 43-73: Has the patient had a colonoscopy / FIT/ Cologuard? No    If the patient is female:    4. For patients aged 43-66: Has the patient had a mammogram within the past 2 years? No      5. For patients aged 21-65: Has the patient had a pap smear? No   There were no vitals taken for this visit. Chief Complaint   Patient presents with    Other     Feels dizzy not sure if his ear affecting it or if its his Bp. Cuts Bp meds in half. Health Maintenance Due   Topic Date Due    COVID-19 Vaccine (1) Never done    Pneumococcal 65+ years Vaccine (1 - PCV) Never done    DTaP/Tdap/Td vaccine (1 - Tdap) Never done    Diabetes screen  Never done    Colorectal Cancer Screen  Never done    Shingles vaccine (1 of 2) Never done    AAA screen  Never done    Lipids  2023    Flu vaccine (1) Never done    Depression Screen  2023    Annual Wellness Visit (AWV)  2023          No questionnaires available.

## 2023-10-04 NOTE — PROGRESS NOTES
Colette Peres (: 1955) is a 80 y.o. male, established patient, here for evaluation of the following chief complaint(s): Other (Feels dizzy not sure if his ear affecting it or if its his Bp. Cuts Bp meds in half. Trying to see why dizzy all the time)       ASSESSMENT/PLAN:  Below is the assessment and plan developed based on review of pertinent history, physical exam, labs, studies, and medications. 1. Dizziness  Chronic  -     AMB POC EKG ROUTINE  -     Vascular duplex carotid bilateral; Future  -     meclizine (ANTIVERT) 25 MG tablet; Take 1 tablet by mouth 3 times daily as needed for Dizziness, Disp-15 tablet, R-0Normal  -     Echo limited (PRN contrast/bubble/strain/3D); Future  EKG: Normal sinus rhythm. Doubt arrhythmias. Normal blood pressure. Doubt blood pressure medication causing symptoms. Cardiac risk factors present. Carotid duplex and echocardiogram ordered. Patient does have history of impaired hearing, may need ENT evaluation. Antivert prescribed in case dizziness recurs. ER precautions discussed. 2. Essential hypertension  Chronic  -     Echo limited (PRN contrast/bubble/strain/3D); Future      Return in about 2 weeks (around 10/18/2023) for chronic care follow-up. SUBJECTIVE/OBJECTIVE:  HPI    78-year-old male past medical history hypertension, hyperlipidemia, tobacco dependence seen in office today for dizzy spells. Patient reports feeling lightheaded for the past several months especially on exertion. He feels as though he is going to pass out, however has not actually passed out. Patient states symptoms last several minutes to hours. They are generally relieved when patient sits down to rest.  Patient reports a history of smoking and a recent change in diet due to his wife's passing (not eating much).   He has been self adjusting his blood pressure medication dosage, splitting the pills in half as he thought medication is dropping his BP and causing his

## 2023-10-18 PROBLEM — N13.8 NODULAR PROSTATE WITH URINARY OBSTRUCTION: Status: RESOLVED | Noted: 2022-06-13 | Resolved: 2023-10-18

## 2023-10-18 PROBLEM — N40.3 NODULAR PROSTATE WITH URINARY OBSTRUCTION: Status: RESOLVED | Noted: 2022-06-13 | Resolved: 2023-10-18

## 2023-10-20 ENCOUNTER — OFFICE VISIT (OUTPATIENT)
Age: 68
End: 2023-10-20
Payer: MEDICARE

## 2023-10-20 VITALS
DIASTOLIC BLOOD PRESSURE: 71 MMHG | OXYGEN SATURATION: 100 % | HEART RATE: 72 BPM | SYSTOLIC BLOOD PRESSURE: 136 MMHG | HEIGHT: 66 IN | WEIGHT: 161 LBS | BODY MASS INDEX: 25.88 KG/M2

## 2023-10-20 DIAGNOSIS — H61.22 IMPACTED CERUMEN OF LEFT EAR: Primary | ICD-10-CM

## 2023-10-20 DIAGNOSIS — H69.91 ETD (EUSTACHIAN TUBE DYSFUNCTION), RIGHT: ICD-10-CM

## 2023-10-20 PROCEDURE — 3017F COLORECTAL CA SCREEN DOC REV: CPT | Performed by: NURSE PRACTITIONER

## 2023-10-20 PROCEDURE — 3078F DIAST BP <80 MM HG: CPT | Performed by: NURSE PRACTITIONER

## 2023-10-20 PROCEDURE — G8419 CALC BMI OUT NRM PARAM NOF/U: HCPCS | Performed by: NURSE PRACTITIONER

## 2023-10-20 PROCEDURE — G8427 DOCREV CUR MEDS BY ELIG CLIN: HCPCS | Performed by: NURSE PRACTITIONER

## 2023-10-20 PROCEDURE — 99203 OFFICE O/P NEW LOW 30 MIN: CPT | Performed by: NURSE PRACTITIONER

## 2023-10-20 PROCEDURE — 4004F PT TOBACCO SCREEN RCVD TLK: CPT | Performed by: NURSE PRACTITIONER

## 2023-10-20 PROCEDURE — 1123F ACP DISCUSS/DSCN MKR DOCD: CPT | Performed by: NURSE PRACTITIONER

## 2023-10-20 PROCEDURE — 3075F SYST BP GE 130 - 139MM HG: CPT | Performed by: NURSE PRACTITIONER

## 2023-10-20 PROCEDURE — G8484 FLU IMMUNIZE NO ADMIN: HCPCS | Performed by: NURSE PRACTITIONER

## 2023-10-20 RX ORDER — PREDNISONE 10 MG/1
TABLET ORAL
Qty: 21 TABLET | Refills: 0 | Status: SHIPPED | OUTPATIENT
Start: 2023-10-20 | End: 2023-10-25

## 2023-10-24 ENCOUNTER — TELEPHONE (OUTPATIENT)
Age: 68
End: 2023-10-24

## 2023-10-25 ENCOUNTER — OFFICE VISIT (OUTPATIENT)
Age: 68
End: 2023-10-25
Payer: MEDICARE

## 2023-10-25 VITALS
WEIGHT: 161 LBS | DIASTOLIC BLOOD PRESSURE: 80 MMHG | HEART RATE: 72 BPM | BODY MASS INDEX: 25.88 KG/M2 | OXYGEN SATURATION: 100 % | SYSTOLIC BLOOD PRESSURE: 131 MMHG | HEIGHT: 66 IN

## 2023-10-25 DIAGNOSIS — C61 PROSTATE CANCER (HCC): Primary | ICD-10-CM

## 2023-10-25 DIAGNOSIS — R97.20 ELEVATED PSA: ICD-10-CM

## 2023-10-25 PROCEDURE — G8484 FLU IMMUNIZE NO ADMIN: HCPCS | Performed by: UROLOGY

## 2023-10-25 PROCEDURE — 3075F SYST BP GE 130 - 139MM HG: CPT | Performed by: UROLOGY

## 2023-10-25 PROCEDURE — 1123F ACP DISCUSS/DSCN MKR DOCD: CPT | Performed by: UROLOGY

## 2023-10-25 PROCEDURE — 3017F COLORECTAL CA SCREEN DOC REV: CPT | Performed by: UROLOGY

## 2023-10-25 PROCEDURE — 4004F PT TOBACCO SCREEN RCVD TLK: CPT | Performed by: UROLOGY

## 2023-10-25 PROCEDURE — 99214 OFFICE O/P EST MOD 30 MIN: CPT | Performed by: UROLOGY

## 2023-10-25 PROCEDURE — 3079F DIAST BP 80-89 MM HG: CPT | Performed by: UROLOGY

## 2023-10-25 PROCEDURE — G8419 CALC BMI OUT NRM PARAM NOF/U: HCPCS | Performed by: UROLOGY

## 2023-10-25 PROCEDURE — G8427 DOCREV CUR MEDS BY ELIG CLIN: HCPCS | Performed by: UROLOGY

## 2023-10-26 LAB — PSA SERPL-MCNC: 12.3 NG/ML (ref 0–4)

## 2023-10-30 ENCOUNTER — OFFICE VISIT (OUTPATIENT)
Age: 68
End: 2023-10-30
Payer: MEDICARE

## 2023-10-30 VITALS
DIASTOLIC BLOOD PRESSURE: 70 MMHG | WEIGHT: 161 LBS | BODY MASS INDEX: 25.88 KG/M2 | SYSTOLIC BLOOD PRESSURE: 132 MMHG | OXYGEN SATURATION: 98 % | HEIGHT: 66 IN | HEART RATE: 74 BPM

## 2023-10-30 DIAGNOSIS — H69.91 ETD (EUSTACHIAN TUBE DYSFUNCTION), RIGHT: Primary | ICD-10-CM

## 2023-10-30 PROCEDURE — G8484 FLU IMMUNIZE NO ADMIN: HCPCS | Performed by: NURSE PRACTITIONER

## 2023-10-30 PROCEDURE — G8419 CALC BMI OUT NRM PARAM NOF/U: HCPCS | Performed by: NURSE PRACTITIONER

## 2023-10-30 PROCEDURE — 3075F SYST BP GE 130 - 139MM HG: CPT | Performed by: NURSE PRACTITIONER

## 2023-10-30 PROCEDURE — 99213 OFFICE O/P EST LOW 20 MIN: CPT | Performed by: NURSE PRACTITIONER

## 2023-10-30 PROCEDURE — 4004F PT TOBACCO SCREEN RCVD TLK: CPT | Performed by: NURSE PRACTITIONER

## 2023-10-30 PROCEDURE — 1123F ACP DISCUSS/DSCN MKR DOCD: CPT | Performed by: NURSE PRACTITIONER

## 2023-10-30 PROCEDURE — 3078F DIAST BP <80 MM HG: CPT | Performed by: NURSE PRACTITIONER

## 2023-10-30 PROCEDURE — 3017F COLORECTAL CA SCREEN DOC REV: CPT | Performed by: NURSE PRACTITIONER

## 2023-10-30 PROCEDURE — G8427 DOCREV CUR MEDS BY ELIG CLIN: HCPCS | Performed by: NURSE PRACTITIONER

## 2023-10-30 RX ORDER — PREDNISONE 10 MG/1
TABLET ORAL
Qty: 21 TABLET | Refills: 0 | Status: SHIPPED | OUTPATIENT
Start: 2023-10-30 | End: 2023-11-04

## 2023-10-30 NOTE — PROGRESS NOTES
Subjective:    Keyanna Merino   80 y.o.   1955     New Patient Visit  This is a 80 y.o. male who is here today to discuss issues with dizziness and ears. Patient states over the last couple weeks he has been having clogging in his right ear as well as intermittent dizziness. He states that the right ear will feel full and he states that it is impacted with wax. He states he has tried many different methods to clean the ear however still having issues with hearing out of the ear. He states that his left ear has been doing well. He states that is not giving him any issues. He denies any pain or drainage. 10/30/2023- Mr Gamez Overall returns today for follow up after being seen on 10/20/2023, with complaints of ear fullness and dizziness. Dizziness is better but not completely resolved. Patient continues to report mild dizziness as well as full sensation in both ears. Denies any discharge. Still endorses some amount of hearing loss from his last visit. Review of Systems  Review of Systems   HENT:  Positive for hearing loss. Neurological:  Positive for dizziness. All other systems reviewed and are negative. 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  Authorizing Provider

## 2023-11-17 ENCOUNTER — OFFICE VISIT (OUTPATIENT)
Age: 68
End: 2023-11-17

## 2023-11-17 VITALS
BODY MASS INDEX: 25.88 KG/M2 | WEIGHT: 161 LBS | OXYGEN SATURATION: 98 % | HEIGHT: 66 IN | SYSTOLIC BLOOD PRESSURE: 122 MMHG | DIASTOLIC BLOOD PRESSURE: 60 MMHG | HEART RATE: 80 BPM

## 2023-11-17 DIAGNOSIS — H69.91 ETD (EUSTACHIAN TUBE DYSFUNCTION), RIGHT: ICD-10-CM

## 2023-11-17 DIAGNOSIS — R42 DIZZINESS AND GIDDINESS: ICD-10-CM

## 2023-11-17 DIAGNOSIS — H65.91 FLUID LEVEL BEHIND TYMPANIC MEMBRANE OF RIGHT EAR: ICD-10-CM

## 2023-11-17 DIAGNOSIS — H93.13 TINNITUS, BILATERAL: ICD-10-CM

## 2023-11-17 DIAGNOSIS — H91.93 BILATERAL HEARING LOSS, UNSPECIFIED HEARING LOSS TYPE: Primary | ICD-10-CM

## 2023-11-17 DIAGNOSIS — H90.6 MIXED CONDUCTIVE AND SENSORINEURAL HEARING LOSS OF BOTH EARS: Primary | ICD-10-CM

## 2023-11-17 RX ORDER — FLUTICASONE PROPIONATE 50 MCG
2 SPRAY, SUSPENSION (ML) NASAL 2 TIMES DAILY
Qty: 16 G | Refills: 2 | Status: SHIPPED | OUTPATIENT
Start: 2023-11-17 | End: 2023-11-17

## 2023-11-17 RX ORDER — FLUTICASONE PROPIONATE 50 MCG
1 SPRAY, SUSPENSION (ML) NASAL 2 TIMES DAILY
Qty: 32 G | Refills: 1 | Status: SHIPPED | OUTPATIENT
Start: 2023-11-17

## 2023-11-17 NOTE — PROGRESS NOTES
Subjective:   Janna Cardoza   80 y.o.   1955     Refered by: No referring provider defined for this encounter. New Patient Visit  Chief Compliant: middle ear effusion    History of Present Illness:  Janna Cardoza is a 80 y.o. male with past medical history of GERD, HLD, HTN, who presents today for evaluation of CODI and ETD. Patient states over the last couple weeks he has been having clogging in his right ear as well as intermittent dizziness. He states that the right ear will feel full and he states that it is impacted with wax. He states he has tried many different methods to clean the ear however still having issues with hearing out of the ear. He states that his left ear has been doing well. He states that is not giving him any issues. He denies any pain or drainage. 10/30/2023- Mr Christina Payne returns today for follow up after being seen on 10/20/2023, with complaints of ear fullness and dizziness. Dizziness is better but not completely resolved. Patient continues to report mild dizziness as well as full sensation in both ears. Denies any discharge. Still endorses     11/17/23:   Status post 2 courses of steroids with persistent right middle ear effusion, effusion has been there for about approximately a month and a half now. Patient's vertigo is persistent. Describes vertigo as episodes of lightheadedness or instability lasting approximately 15 minutes. Typically happens after standing up from a seated position or getting up from a recumbent position. Can occasionally happen when leaning down. Has noted that it started after starting his blood pressure medications. Audiogram: Bilateral mild sloping to severe mixed hearing loss. Bilateral flat type B temps. Review of Systems  Consitutional: denies fever, excessive weight gain or loss. Eyes: denies diplopia, eye pain. Integumentary: denies new concerning skin lesions.   Ears, Nose, Mouth, Throat: denies except as per

## 2024-01-04 ENCOUNTER — APPOINTMENT (OUTPATIENT)
Facility: HOSPITAL | Age: 69
DRG: 812 | End: 2024-01-04
Payer: MEDICARE

## 2024-01-04 ENCOUNTER — HOSPITAL ENCOUNTER (INPATIENT)
Facility: HOSPITAL | Age: 69
LOS: 1 days | Discharge: HOME | DRG: 812 | End: 2024-01-05
Attending: EMERGENCY MEDICINE | Admitting: FAMILY MEDICINE
Payer: MEDICARE

## 2024-01-04 DIAGNOSIS — R06.02 SHORTNESS OF BREATH: ICD-10-CM

## 2024-01-04 DIAGNOSIS — D64.9 SYMPTOMATIC ANEMIA: Primary | ICD-10-CM

## 2024-01-04 PROBLEM — E78.00 HYPERCHOLESTEROLEMIA: Status: ACTIVE | Noted: 2022-06-13

## 2024-01-04 PROBLEM — H91.91 HEARING LOSS OF RIGHT EAR: Status: RESOLVED | Noted: 2022-09-13 | Resolved: 2024-01-04

## 2024-01-04 PROBLEM — K21.9 GERD (GASTROESOPHAGEAL REFLUX DISEASE): Status: ACTIVE | Noted: 2024-01-04

## 2024-01-04 PROBLEM — R06.00 DYSPNEA: Status: ACTIVE | Noted: 2024-01-04

## 2024-01-04 PROBLEM — I10 HYPERTENSION: Status: ACTIVE | Noted: 2022-06-13

## 2024-01-04 PROBLEM — H35.9: Status: ACTIVE | Noted: 2024-01-04

## 2024-01-04 LAB
ALBUMIN SERPL-MCNC: 4.2 G/DL (ref 3.5–5.2)
ALBUMIN/GLOB SERPL: 1.3 (ref 1.1–2.2)
ALP SERPL-CCNC: 94 U/L (ref 40–129)
ALT SERPL-CCNC: 18 U/L (ref 10–50)
AMPHET UR QL SCN: NEGATIVE
ANION GAP SERPL CALC-SCNC: 11 MMOL/L (ref 5–15)
APPEARANCE UR: CLEAR
AST SERPL-CCNC: 22 U/L (ref 10–50)
BARBITURATES UR QL SCN: NEGATIVE
BASOPHILS # BLD: 0.1 K/UL (ref 0–0.1)
BASOPHILS NFR BLD: 1 % (ref 0–1)
BENZODIAZ UR QL: NEGATIVE
BILIRUB SERPL-MCNC: 0.4 MG/DL (ref 0.2–1)
BILIRUB UR QL: NEGATIVE
BUN SERPL-MCNC: 14 MG/DL (ref 8–23)
BUN/CREAT SERPL: 19 (ref 12–20)
CALCIUM SERPL-MCNC: 8.9 MG/DL (ref 8.8–10.2)
CANNABINOIDS UR QL SCN: NEGATIVE
CHLORIDE SERPL-SCNC: 106 MMOL/L (ref 98–107)
CHOLEST SERPL-MCNC: 112 MG/DL
CO2 SERPL-SCNC: 23 MMOL/L (ref 22–29)
COCAINE UR QL SCN: NEGATIVE
COLOR UR: NORMAL
COMMENT:: NORMAL
CREAT SERPL-MCNC: 0.74 MG/DL (ref 0.7–1.2)
D DIMER PPP FEU-MCNC: 0.39 UG/ML(FEU)
DIFFERENTIAL METHOD BLD: ABNORMAL
ECHO BSA: 1.84 M2
EKG ATRIAL RATE: 79 BPM
EKG DIAGNOSIS: NORMAL
EKG P AXIS: 37 DEGREES
EKG P-R INTERVAL: 146 MS
EKG Q-T INTERVAL: 376 MS
EKG QRS DURATION: 90 MS
EKG QTC CALCULATION (BAZETT): 431 MS
EKG R AXIS: 58 DEGREES
EKG T AXIS: 57 DEGREES
EKG VENTRICULAR RATE: 79 BPM
EOSINOPHIL # BLD: 0.2 K/UL (ref 0–0.4)
EOSINOPHIL NFR BLD: 3 % (ref 0–7)
ERYTHROCYTE [DISTWIDTH] IN BLOOD BY AUTOMATED COUNT: 20.8 % (ref 11.5–14.5)
ETHANOL SERPL-MCNC: <10 MG/DL (ref 0–0.08)
GLOBULIN SER CALC-MCNC: 3.2 G/DL (ref 2–4)
GLUCOSE SERPL-MCNC: 113 MG/DL (ref 65–100)
GLUCOSE UR STRIP.AUTO-MCNC: NEGATIVE MG/DL
HAPTOGLOB SERPL-MCNC: 199 MG/DL (ref 30–200)
HCT VFR BLD AUTO: 23.9 % (ref 36.6–50.3)
HDLC SERPL-MCNC: 42 MG/DL
HDLC SERPL: 2.7 (ref 0–5)
HEMOCCULT STL QL: NEGATIVE
HGB BLD-MCNC: 6.3 G/DL (ref 12.1–17)
HGB UR QL STRIP: NEGATIVE
IMM GRANULOCYTES # BLD AUTO: 0 K/UL (ref 0–0.04)
IMM GRANULOCYTES NFR BLD AUTO: 0 % (ref 0–0.5)
IRON SATN MFR SERPL: 3 % (ref 20–50)
IRON SERPL-MCNC: 16 UG/DL (ref 35–150)
KETONES UR QL STRIP.AUTO: NEGATIVE MG/DL
LDH SERPL L TO P-CCNC: 169 U/L (ref 85–241)
LDLC SERPL CALC-MCNC: 46.8 MG/DL (ref 0–100)
LEUKOCYTE ESTERASE UR QL STRIP.AUTO: NEGATIVE
LYMPHOCYTES # BLD: 1.6 K/UL (ref 0.8–3.5)
LYMPHOCYTES NFR BLD: 29 % (ref 12–49)
Lab: NORMAL
MAGNESIUM SERPL-MCNC: 2.1 MG/DL (ref 1.6–2.4)
MCH RBC QN AUTO: 17.5 PG (ref 26–34)
MCHC RBC AUTO-ENTMCNC: 26.4 G/DL (ref 30–36.5)
MCV RBC AUTO: 66.4 FL (ref 80–99)
METHADONE UR QL: NEGATIVE
MONOCYTES # BLD: 0.6 K/UL (ref 0–1)
MONOCYTES NFR BLD: 10 % (ref 5–13)
NEUTS SEG # BLD: 3 K/UL (ref 1.8–8)
NEUTS SEG NFR BLD: 57 % (ref 32–75)
NITRITE UR QL STRIP.AUTO: NEGATIVE
NRBC # BLD: 0 K/UL (ref 0–0.01)
NRBC BLD-RTO: 0 PER 100 WBC
OPIATES UR QL: NEGATIVE
PCP UR QL: NEGATIVE
PH UR STRIP: 6 (ref 5–8)
PHOSPHATE SERPL-MCNC: 3.3 MG/DL (ref 2.5–4.5)
PLATELET # BLD AUTO: 299 K/UL (ref 150–400)
PMV BLD AUTO: 9.4 FL (ref 8.9–12.9)
POTASSIUM SERPL-SCNC: 3.9 MMOL/L (ref 3.5–5.1)
PROCALCITONIN SERPL-MCNC: <0.05 NG/ML
PROT SERPL-MCNC: 7.4 G/DL (ref 6.4–8.3)
PROT UR STRIP-MCNC: NEGATIVE MG/DL
RBC # BLD AUTO: 3.6 M/UL (ref 4.1–5.7)
RBC MORPH BLD: ABNORMAL
SODIUM SERPL-SCNC: 140 MMOL/L (ref 136–145)
SP GR UR REFRACTOMETRY: 1.01 (ref 1–1.03)
SPECIMEN HOLD: NORMAL
TIBC SERPL-MCNC: 464 UG/DL (ref 250–450)
TRIGL SERPL-MCNC: 116 MG/DL
TROPONIN I BLD-MCNC: <0.04 NG/ML (ref 0–0.08)
UROBILINOGEN UR QL STRIP.AUTO: 0.2 EU/DL (ref 0.2–1)
VLDLC SERPL CALC-MCNC: 23.2 MG/DL
WBC # BLD AUTO: 5.5 K/UL (ref 4.1–11.1)

## 2024-01-04 PROCEDURE — 2580000003 HC RX 258: Performed by: INTERNAL MEDICINE

## 2024-01-04 PROCEDURE — 36415 COLL VENOUS BLD VENIPUNCTURE: CPT

## 2024-01-04 PROCEDURE — 84484 ASSAY OF TROPONIN QUANT: CPT

## 2024-01-04 PROCEDURE — 82272 OCCULT BLD FECES 1-3 TESTS: CPT

## 2024-01-04 PROCEDURE — 1100000000 HC RM PRIVATE

## 2024-01-04 PROCEDURE — 93005 ELECTROCARDIOGRAM TRACING: CPT | Performed by: EMERGENCY MEDICINE

## 2024-01-04 PROCEDURE — 83010 ASSAY OF HAPTOGLOBIN QUANT: CPT

## 2024-01-04 PROCEDURE — 6360000002 HC RX W HCPCS: Performed by: INTERNAL MEDICINE

## 2024-01-04 PROCEDURE — 86850 RBC ANTIBODY SCREEN: CPT

## 2024-01-04 PROCEDURE — 82607 VITAMIN B-12: CPT

## 2024-01-04 PROCEDURE — 85025 COMPLETE CBC W/AUTO DIFF WBC: CPT

## 2024-01-04 PROCEDURE — 80307 DRUG TEST PRSMV CHEM ANLYZR: CPT

## 2024-01-04 PROCEDURE — 86923 COMPATIBILITY TEST ELECTRIC: CPT

## 2024-01-04 PROCEDURE — 80074 ACUTE HEPATITIS PANEL: CPT

## 2024-01-04 PROCEDURE — 81002 URINALYSIS NONAUTO W/O SCOPE: CPT

## 2024-01-04 PROCEDURE — C9113 INJ PANTOPRAZOLE SODIUM, VIA: HCPCS | Performed by: INTERNAL MEDICINE

## 2024-01-04 PROCEDURE — 71046 X-RAY EXAM CHEST 2 VIEWS: CPT

## 2024-01-04 PROCEDURE — 82077 ASSAY SPEC XCP UR&BREATH IA: CPT

## 2024-01-04 PROCEDURE — 80061 LIPID PANEL: CPT

## 2024-01-04 PROCEDURE — 83615 LACTATE (LD) (LDH) ENZYME: CPT

## 2024-01-04 PROCEDURE — 93010 ELECTROCARDIOGRAM REPORT: CPT | Performed by: SPECIALIST

## 2024-01-04 PROCEDURE — 83735 ASSAY OF MAGNESIUM: CPT

## 2024-01-04 PROCEDURE — 86900 BLOOD TYPING SEROLOGIC ABO: CPT

## 2024-01-04 PROCEDURE — 84145 PROCALCITONIN (PCT): CPT

## 2024-01-04 PROCEDURE — 83550 IRON BINDING TEST: CPT

## 2024-01-04 PROCEDURE — 87086 URINE CULTURE/COLONY COUNT: CPT

## 2024-01-04 PROCEDURE — 80053 COMPREHEN METABOLIC PANEL: CPT

## 2024-01-04 PROCEDURE — 82728 ASSAY OF FERRITIN: CPT

## 2024-01-04 PROCEDURE — 86901 BLOOD TYPING SEROLOGIC RH(D): CPT

## 2024-01-04 PROCEDURE — 83540 ASSAY OF IRON: CPT

## 2024-01-04 PROCEDURE — 93971 EXTREMITY STUDY: CPT

## 2024-01-04 PROCEDURE — 84100 ASSAY OF PHOSPHORUS: CPT

## 2024-01-04 PROCEDURE — A4216 STERILE WATER/SALINE, 10 ML: HCPCS | Performed by: INTERNAL MEDICINE

## 2024-01-04 PROCEDURE — 85379 FIBRIN DEGRADATION QUANT: CPT

## 2024-01-04 PROCEDURE — 0202U NFCT DS 22 TRGT SARS-COV-2: CPT

## 2024-01-04 PROCEDURE — 99285 EMERGENCY DEPT VISIT HI MDM: CPT

## 2024-01-04 PROCEDURE — 82746 ASSAY OF FOLIC ACID SERUM: CPT

## 2024-01-04 RX ORDER — SODIUM CHLORIDE 0.9 % (FLUSH) 0.9 %
5-40 SYRINGE (ML) INJECTION EVERY 12 HOURS SCHEDULED
Status: DISCONTINUED | OUTPATIENT
Start: 2024-01-04 | End: 2024-01-05 | Stop reason: HOSPADM

## 2024-01-04 RX ORDER — SODIUM CHLORIDE 9 MG/ML
INJECTION, SOLUTION INTRAVENOUS PRN
Status: DISCONTINUED | OUTPATIENT
Start: 2024-01-04 | End: 2024-01-05 | Stop reason: HOSPADM

## 2024-01-04 RX ORDER — SODIUM CHLORIDE, SODIUM LACTATE, POTASSIUM CHLORIDE, CALCIUM CHLORIDE 600; 310; 30; 20 MG/100ML; MG/100ML; MG/100ML; MG/100ML
INJECTION, SOLUTION INTRAVENOUS CONTINUOUS
Status: DISCONTINUED | OUTPATIENT
Start: 2024-01-04 | End: 2024-01-05 | Stop reason: HOSPADM

## 2024-01-04 RX ORDER — ONDANSETRON 4 MG/1
4 TABLET, ORALLY DISINTEGRATING ORAL EVERY 8 HOURS PRN
Status: DISCONTINUED | OUTPATIENT
Start: 2024-01-04 | End: 2024-01-05 | Stop reason: HOSPADM

## 2024-01-04 RX ORDER — SODIUM CHLORIDE 0.9 % (FLUSH) 0.9 %
5-40 SYRINGE (ML) INJECTION PRN
Status: DISCONTINUED | OUTPATIENT
Start: 2024-01-04 | End: 2024-01-05 | Stop reason: HOSPADM

## 2024-01-04 RX ORDER — MAGNESIUM SULFATE IN WATER 40 MG/ML
2000 INJECTION, SOLUTION INTRAVENOUS PRN
Status: DISCONTINUED | OUTPATIENT
Start: 2024-01-04 | End: 2024-01-05 | Stop reason: HOSPADM

## 2024-01-04 RX ORDER — ACETAMINOPHEN 325 MG/1
650 TABLET ORAL EVERY 6 HOURS PRN
Status: DISCONTINUED | OUTPATIENT
Start: 2024-01-04 | End: 2024-01-05 | Stop reason: HOSPADM

## 2024-01-04 RX ORDER — ONDANSETRON 2 MG/ML
4 INJECTION INTRAMUSCULAR; INTRAVENOUS EVERY 6 HOURS PRN
Status: DISCONTINUED | OUTPATIENT
Start: 2024-01-04 | End: 2024-01-05 | Stop reason: HOSPADM

## 2024-01-04 RX ORDER — POLYETHYLENE GLYCOL 3350 17 G/17G
17 POWDER, FOR SOLUTION ORAL DAILY PRN
Status: DISCONTINUED | OUTPATIENT
Start: 2024-01-04 | End: 2024-01-05 | Stop reason: HOSPADM

## 2024-01-04 RX ORDER — POTASSIUM CHLORIDE 750 MG/1
40 TABLET, FILM COATED, EXTENDED RELEASE ORAL PRN
Status: DISCONTINUED | OUTPATIENT
Start: 2024-01-04 | End: 2024-01-05 | Stop reason: HOSPADM

## 2024-01-04 RX ORDER — POTASSIUM CHLORIDE 7.45 MG/ML
10 INJECTION INTRAVENOUS PRN
Status: DISCONTINUED | OUTPATIENT
Start: 2024-01-04 | End: 2024-01-05 | Stop reason: HOSPADM

## 2024-01-04 RX ORDER — ACETAMINOPHEN 650 MG/1
650 SUPPOSITORY RECTAL EVERY 6 HOURS PRN
Status: DISCONTINUED | OUTPATIENT
Start: 2024-01-04 | End: 2024-01-05 | Stop reason: HOSPADM

## 2024-01-04 RX ADMIN — PANTOPRAZOLE SODIUM 40 MG: 40 INJECTION, POWDER, FOR SOLUTION INTRAVENOUS at 22:59

## 2024-01-04 RX ADMIN — SODIUM CHLORIDE, POTASSIUM CHLORIDE, SODIUM LACTATE AND CALCIUM CHLORIDE: 600; 310; 30; 20 INJECTION, SOLUTION INTRAVENOUS at 21:59

## 2024-01-04 ASSESSMENT — ENCOUNTER SYMPTOMS
SHORTNESS OF BREATH: 1
SORE THROAT: 0
CONSTIPATION: 0

## 2024-01-04 ASSESSMENT — PAIN - FUNCTIONAL ASSESSMENT: PAIN_FUNCTIONAL_ASSESSMENT: 0-10

## 2024-01-04 ASSESSMENT — PAIN SCALES - GENERAL: PAINLEVEL_OUTOF10: 0

## 2024-01-04 NOTE — ED TRIAGE NOTES
Patient to ED in NAD, resp even and unlabored but is reporting difficulty breathing. Is attempting to quit smoking cigarettes, is down to 2 a day. Also endorsing L calf pain. Denies fever, nausea,vomiting and diarrhea.

## 2024-01-04 NOTE — ED PROVIDER NOTES
[facility] EMERGENCY DEPT  EMERGENCY DEPARTMENT ENCOUNTER      Pt Name: Miguelito Ascencio  MRN: [id number]  Birthdate 1955  Date of evaluation: 1/4/2024  Provider: Herb Corrigan MD    CHIEF COMPLAINT       Chief Complaint   Patient presents with    Shortness of Breath         HISTORY OF PRESENT ILLNESS   (Location/Symptom, Timing/Onset, Context/Setting, Quality, Duration, Modifying Factors, Severity)  Note limiting factors.   8-year-old male presents from home with complaints of shortness of breath and fatigue.  Symptoms worsening over the past several weeks.  States has been trying to quit smoking during this time.  Again this is the cause.  Denies any fever, cough, vomiting.  Denies any red blood in his stool or melena.  No other complaints.  Does have a history of prostate cancer that is being watched.    The history is provided by the patient and medical records.         Review of External Medical Records:     Nursing Notes were reviewed.    REVIEW OF SYSTEMS    (2-9 systems for level 4, 10 or more for level 5)     Review of Systems   Constitutional:  Positive for fatigue.   HENT:  Negative for sore throat.    Eyes:  Negative for visual disturbance.   Respiratory:  Positive for shortness of breath.    Cardiovascular:  Negative for palpitations.   Gastrointestinal:  Negative for constipation.   Genitourinary:  Negative for difficulty urinating.   Musculoskeletal:  Negative for myalgias.   Skin:  Negative for rash.       Except as noted above the remainder of the review of systems was reviewed and negative.       

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
## 2024-01-04 NOTE — ED NOTES
Patient updated on plan of care, patient resting in room. Provided microwave meal, cookies, crackers, water, pepsi. Patient denies pain, SOB at this time. No increased WOB. Ambulatory to bathroom without difficulty.

## 2024-01-05 VITALS
HEIGHT: 66 IN | WEIGHT: 160 LBS | BODY MASS INDEX: 25.71 KG/M2 | TEMPERATURE: 97.5 F | HEART RATE: 73 BPM | DIASTOLIC BLOOD PRESSURE: 77 MMHG | SYSTOLIC BLOOD PRESSURE: 144 MMHG | RESPIRATION RATE: 16 BRPM | OXYGEN SATURATION: 97 %

## 2024-01-05 PROBLEM — D50.9 MICROCYTIC ANEMIA: Status: ACTIVE | Noted: 2024-01-04

## 2024-01-05 LAB
-: NORMAL
ALBUMIN SERPL-MCNC: 3.1 G/DL (ref 3.5–5)
ALBUMIN/GLOB SERPL: 1 (ref 1.1–2.2)
ALP SERPL-CCNC: 73 U/L (ref 45–117)
ALT SERPL-CCNC: 18 U/L (ref 12–78)
ANION GAP SERPL CALC-SCNC: 7 MMOL/L (ref 5–15)
AST SERPL-CCNC: 22 U/L (ref 15–37)
B PERT DNA SPEC QL NAA+PROBE: NOT DETECTED
BACTERIA SPEC CULT: NORMAL
BILIRUB SERPL-MCNC: 0.9 MG/DL (ref 0.2–1)
BORDETELLA PARAPERTUSSIS BY PCR: NOT DETECTED
BUN SERPL-MCNC: 12 MG/DL (ref 6–20)
BUN/CREAT SERPL: 11 (ref 12–20)
C PNEUM DNA SPEC QL NAA+PROBE: NOT DETECTED
CALCIUM SERPL-MCNC: 7.8 MG/DL (ref 8.5–10.1)
CHLORIDE SERPL-SCNC: 117 MMOL/L (ref 97–108)
CO2 SERPL-SCNC: 19 MMOL/L (ref 21–32)
CREAT SERPL-MCNC: 1.12 MG/DL (ref 0.7–1.3)
ERYTHROCYTE [DISTWIDTH] IN BLOOD BY AUTOMATED COUNT: 21.3 % (ref 11.5–14.5)
FERRITIN SERPL-MCNC: 3 NG/ML (ref 26–388)
FLUAV SUBTYP SPEC NAA+PROBE: NOT DETECTED
FLUBV RNA SPEC QL NAA+PROBE: NOT DETECTED
FOLATE SERPL-MCNC: 46.4 NG/ML (ref 5–21)
GLOBULIN SER CALC-MCNC: 3 G/DL (ref 2–4)
GLUCOSE SERPL-MCNC: 116 MG/DL (ref 65–100)
HADV DNA SPEC QL NAA+PROBE: NOT DETECTED
HAV IGM SER QL: NONREACTIVE
HBV CORE IGM SER QL: NONREACTIVE
HBV SURFACE AG SER QL: 0.15 INDEX
HBV SURFACE AG SER QL: NEGATIVE
HCOV 229E RNA SPEC QL NAA+PROBE: NOT DETECTED
HCOV HKU1 RNA SPEC QL NAA+PROBE: NOT DETECTED
HCOV NL63 RNA SPEC QL NAA+PROBE: NOT DETECTED
HCOV OC43 RNA SPEC QL NAA+PROBE: NOT DETECTED
HCT VFR BLD AUTO: 25.1 % (ref 36.6–50.3)
HCT VFR BLD AUTO: 26.1 % (ref 36.6–50.3)
HCV AB SER IA-ACNC: 0.17 INDEX
HCV AB SERPL QL IA: NONREACTIVE
HGB BLD-MCNC: 7.1 G/DL (ref 12.1–17)
HGB BLD-MCNC: 7.3 G/DL (ref 12.1–17)
HISTORY CHECK: NORMAL
HMPV RNA SPEC QL NAA+PROBE: NOT DETECTED
HPIV1 RNA SPEC QL NAA+PROBE: NOT DETECTED
HPIV2 RNA SPEC QL NAA+PROBE: NOT DETECTED
HPIV3 RNA SPEC QL NAA+PROBE: NOT DETECTED
HPIV4 RNA SPEC QL NAA+PROBE: NOT DETECTED
M PNEUMO DNA SPEC QL NAA+PROBE: NOT DETECTED
MAGNESIUM SERPL-MCNC: 2.1 MG/DL (ref 1.6–2.4)
MCH RBC QN AUTO: 19.6 PG (ref 26–34)
MCHC RBC AUTO-ENTMCNC: 28.3 G/DL (ref 30–36.5)
MCV RBC AUTO: 69.3 FL (ref 80–99)
NRBC # BLD: 0 K/UL (ref 0–0.01)
NRBC BLD-RTO: 0 PER 100 WBC
PHOSPHATE SERPL-MCNC: 3 MG/DL (ref 2.6–4.7)
PLATELET # BLD AUTO: 273 K/UL (ref 150–400)
PMV BLD AUTO: 9.7 FL (ref 8.9–12.9)
POTASSIUM SERPL-SCNC: 3.8 MMOL/L (ref 3.5–5.1)
PROT SERPL-MCNC: 6.1 G/DL (ref 6.4–8.2)
RBC # BLD AUTO: 3.62 M/UL (ref 4.1–5.7)
RSV RNA SPEC QL NAA+PROBE: NOT DETECTED
RV+EV RNA SPEC QL NAA+PROBE: NOT DETECTED
SARS-COV-2 RNA RESP QL NAA+PROBE: NOT DETECTED
SERVICE CMNT-IMP: NORMAL
SODIUM SERPL-SCNC: 143 MMOL/L (ref 136–145)
VIT B12 SERPL-MCNC: 279 PG/ML (ref 193–986)
WBC # BLD AUTO: 7.5 K/UL (ref 4.1–11.1)

## 2024-01-05 PROCEDURE — 36430 TRANSFUSION BLD/BLD COMPNT: CPT

## 2024-01-05 PROCEDURE — 36415 COLL VENOUS BLD VENIPUNCTURE: CPT

## 2024-01-05 PROCEDURE — 6360000002 HC RX W HCPCS: Performed by: INTERNAL MEDICINE

## 2024-01-05 PROCEDURE — 84100 ASSAY OF PHOSPHORUS: CPT

## 2024-01-05 PROCEDURE — P9016 RBC LEUKOCYTES REDUCED: HCPCS

## 2024-01-05 PROCEDURE — C9113 INJ PANTOPRAZOLE SODIUM, VIA: HCPCS | Performed by: INTERNAL MEDICINE

## 2024-01-05 PROCEDURE — 80053 COMPREHEN METABOLIC PANEL: CPT

## 2024-01-05 PROCEDURE — 83735 ASSAY OF MAGNESIUM: CPT

## 2024-01-05 PROCEDURE — 85027 COMPLETE CBC AUTOMATED: CPT

## 2024-01-05 PROCEDURE — 85018 HEMOGLOBIN: CPT

## 2024-01-05 PROCEDURE — A4216 STERILE WATER/SALINE, 10 ML: HCPCS | Performed by: INTERNAL MEDICINE

## 2024-01-05 PROCEDURE — 30233N1 TRANSFUSION OF NONAUTOLOGOUS RED BLOOD CELLS INTO PERIPHERAL VEIN, PERCUTANEOUS APPROACH: ICD-10-PCS | Performed by: INTERNAL MEDICINE

## 2024-01-05 PROCEDURE — 85014 HEMATOCRIT: CPT

## 2024-01-05 PROCEDURE — 94761 N-INVAS EAR/PLS OXIMETRY MLT: CPT

## 2024-01-05 PROCEDURE — 2580000003 HC RX 258: Performed by: INTERNAL MEDICINE

## 2024-01-05 RX ORDER — FERROUS SULFATE 325(65) MG
325 TABLET ORAL
Qty: 90 TABLET | Refills: 1 | Status: SHIPPED | OUTPATIENT
Start: 2024-01-05

## 2024-01-05 RX ADMIN — SODIUM CHLORIDE, PRESERVATIVE FREE 10 ML: 5 INJECTION INTRAVENOUS at 08:29

## 2024-01-05 RX ADMIN — IRON SUCROSE 100 MG: 20 INJECTION, SOLUTION INTRAVENOUS at 10:07

## 2024-01-05 RX ADMIN — PANTOPRAZOLE SODIUM 40 MG: 40 INJECTION, POWDER, FOR SOLUTION INTRAVENOUS at 08:30

## 2024-01-05 ASSESSMENT — PAIN SCALES - GENERAL: PAINLEVEL_OUTOF10: 0

## 2024-01-05 NOTE — H&P
[facility]    Hospitalist Admission Note                                                                                                                                  NAME:  Miguelito Ascencio   :   1955   MRN:  [id number]     PCP:  Bonilla Maki MD     Date/Time of service:  2024 7:03 PM  To assist coordination of care and communication with nursing and staff, this note may be preliminary early in the day, but finalized by end of the day.        Subjective:     CHIEF COMPLAINT: dyspnea     HISTORY OF PRESENT ILLNESS:     Mr. Ascencio is a 68 y.o. male who presented to the Emergency Department complaining of dyspnea. Worsening over months.  ER finds severe anemia. We will admit him for management.    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 Authorizing Provider   fluticasone (FLONASE) 50 MCG/ACT nasal spray 1 spray by Each Nostril route in the morning and at bedtime  Patient not taking: Reported on 23   Mary Anne Yost MD   atorvastatin (LIPITOR) 40 MG tablet Take 1 tablet by mouth daily 22   Automatic Reconciliation, Ar   losartan (COZAAR) 25 MG tablet Take 1 tablet by mouth daily  hours.  Results       Procedure Component Value Units Date/Time    Respiratory Panel, Molecular, with COVID-19 (Restricted: peds pts or suitable admitted adults) [[id number]]     Order Status: Sent Specimen: Nasopharyngeal     Culture, Urine [[id number]]     Order Status: Sent Specimen: Urine, clean catch             I have reviewed previous records       Assessment and Plan:      Anemia - POA, unclear cause or chronicity.  Suspect worsening over months and suspect GI source. PPI. GI consult.  Endoscopy tomorrow if it can be scheduled.  NPO.  Laxatives.    Dyspnea - POA, presumed mainly due to anemia, but need to rule out URI.  Smoking/COPD could contribute, outpatient follow up needed.    Hypertension - BP low due to anemia.  Hold loartan. Hydrate    GERD (gastroesophageal reflux disease) - Add PPI IBD by IV for now.        Hypercholesterolemia - No hx of CAD nor CVA to demand tight control.  Check panel.  Hold statin      Prostate cancer / Elevated PSA - Followed by urology.      Current smoker - Advise cessation      Poor vision / Retina degeneration - Supportive care. Followed by ophthalmology    Telemetry reviewed:   normal sinus rhythm    Risk of deterioration: high      Total time spent with patient: 50 Minutes I personally reviewed chart, notes, data and current medications in the medical record.  I have personally examined and treated the patient at bedside during this period.                  Care Plan discussed with: Patient, Nursing Staff, and >50% of time spent in counseling and coordination of care    Discussed:  Care Plan and D/C Planning       ___________________________________________________    Attending Physician: Spencer Borrero MD

## 2024-01-05 NOTE — PROGRESS NOTES
[facility]  [address] [place] [address]  [phone]      Hospitalist  Progress Note      NAME:       Miguelito Ascencio   :        1955  MRM:        [id number]    Date of service: 2024      Subjective: Patient seen and examined by me. Patient admitted with severe anemia. He says he cannot tell if he has melena given he is blind. No abdominal discomfort. No chest pain or SOB.      Objective:    Vital Signs:    BP (!) 132/104   Pulse 88   Temp 97.8 °F (36.6 °C) (Oral)   Resp 16   Ht 1.676 m (5' 6\")   Wt 72.6 kg (160 lb)   SpO2 100%   BMI 25.82 kg/m²        Intake/Output Summary (Last 24 hours) at 2024 0746  Last data filed at 2024 0338  Gross per 24 hour   Intake 281.67 ml   Output --   Net 281.67 ml        Current inpatient medications reviewed:  Current Facility-Administered Medications   Medication Dose Route Frequency    pantoprazole (PROTONIX) 40 mg in sodium chloride (PF) 0.9 % 10 mL injection  40 mg IntraVENous Q12H    sodium chloride flush 0.9 % injection 5-40 mL  5-40 mL IntraVENous 2 times per day    sodium chloride flush 0.9 % injection 5-40 mL  5-40 mL IntraVENous PRN    0.9 % sodium chloride infusion   IntraVENous PRN    potassium chloride (KLOR-CON) extended release tablet 40 mEq  40 mEq Oral PRN    Or    potassium bicarb-citric acid (EFFER-K) effervescent tablet 40 mEq  40 mEq Oral PRN    Or    potassium chloride 10 mEq/100 mL IVPB (Peripheral Line)  10 mEq IntraVENous PRN    magnesium sulfate 2000 mg in 50 mL IVPB premix  2,000 mg IntraVENous PRN    ondansetron (ZOFRAN-ODT) disintegrating tablet 4 mg  4 mg Oral Q8H PRN    Or    ondansetron (ZOFRAN) injection 4 mg  4 mg IntraVENous Q6H PRN    polyethylene glycol (GLYCOLAX) packet 17 g  17 g Oral Daily PRN    acetaminophen (TYLENOL) tablet 650 mg  650 mg Oral Q6H PRN    Or    acetaminophen (TYLENOL) suppository 650 mg  650    ALKPHOS 94  --  73   AST 22  --  22   ALT 18  --  18     No components found for: \"GLUCOSEPOC\"  Lab Results   Component Value Date/Time    CHOL 112 01/04/2024 01:40 PM    TRIG 116 01/04/2024 01:40 PM    HDL 42 01/04/2024 01:40 PM    LDLCALC 46.8 01/04/2024 01:40 PM    LABVLDL 23.2 01/04/2024 01:40 PM       Imaging data reviewed:    XR CHEST (2 VW)    Result Date: 1/4/2024  No acute process.     Imaging studies reviewed and reports noted, Telemetry reviewed and independently interpreted by me and available notes from other care providers - all reviewed by me on: 1/5/2024    Assessment and Plan:    Microcytic anemia POA: admitted with symptoms. Iron low on serologies. He is sp transfusion with 1u pRBCs and Hgb is 7.1. Likely to drop further. Check stool for occult blood. Continue IV Venofer, IV Pantoprazole and consult GI for endoscopic testing. Monitor and transfuse to keep Hgb > 7     Hypertension / Hypercholesterolemia POA: Hold Losartan and Lipitor for now.    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 Outpatient follow up    Prostate cancer / Elevated PSA POA: outpatient follow up with Urology    GERD (gastroesophageal reflux disease) POA: continue Pantoprazole    Care Plan discussed with: Patient, Nursing, CM     Prophylaxis:  H2B/PPI                Anticipated Disposition:  Home w/Family    PCP:      Bonilla Maki MD     I have personally examined and treated the patient at bedside during this period. To assist coordination of care and communication with nursing and staff, this note may be preliminary early in the day, but finalized by end of the day.         ___________________________________________________    Attending Physician:   Tyler Edge MD

## 2024-01-05 NOTE — ED NOTES
No transfusion reaction noted. Pt tolerating blood transfusion. Pt states they will be going to sleep. Call bell within reach

## 2024-01-05 NOTE — ED NOTES
Patient arrives to [place] [facility] via [facility] from [facility] for inpatient admission hold    Patient ambulatory to restroom prior to placing on monitor

## 2024-01-05 NOTE — CONSENT
Informed Consent for Blood Component Transfusion Note    I have discussed with the patient the rationale for blood component transfusion; its benefits in treating or preventing fatigue, organ damage, or death; and its risk which includes mild transfusion reactions, rare risk of blood borne infection, or more serious but rare reactions. I have discussed the alternatives to transfusion, including the risk and consequences of not receiving transfusion. The patient had an opportunity to ask questions and had agreed to proceed with transfusion of blood components.    Electronically signed by Spencer Borrero MD on 1/4/24 at 9:22 PM EST

## 2024-01-05 NOTE — ED NOTES
TRANSFER - IN REPORT:    Verbal report received from JULIÁN Erickson on Miguelito Ascencio  being received from [facility] for Inpatient Hold in ED       Report consisted of patient's Situation, Background, Assessment and   Recommendations(SBAR).     Information from the following report(s) ED SBAR, Intake/Output, MAR, Recent Results, and Cardiac Rhythm SR  was reviewed with the receiving nurse.    Opportunity for questions and clarification was provided.      Transport team on site

## 2024-01-05 NOTE — CONSULTS
Gastroenterology Consult     Referring Physician:Dr. Espinal    Consult Date: 1/5/2024     Subjective:     Chief Complaint: Severe iron deficiency anemia    History of Present Illness: Miguelito Ascencio is a 68 y.o. male who is seen in consultation for severe symptomatic iron deficiency anemia.    Patient has hypertension, prostate cancer and blindness due to retinal degeneration.  Patient has been feeling very lightheaded and weak for several weeks now exertional dyspnea so decided to come to ED yesterday as he was barely able to walk few steps.  He initially thought smoking-related shortness of breath so deferred care until now.  Because of blindness, he is not sure if he has seen any blood in the stool or dark black tarry stool but not told to have any bleeding by his family.  Before arriving at [facility] ED, he was at an outside urgent care and did have a bowel movement but not told to have any dark black tarry stool or bright red blood per rectum.  He reportedly completed stool based colon cancer screening a year ago, results not known.  He denied any abdominal pain, nausea, vomiting, weight loss, anorexia.  He has heartburn and pain on omeprazole 40 mg daily for years.  No prior upper endoscopy or colonoscopy.  No family history of colon cancer.  He denies taking any nonsteroidal.  Not on any antiplatelet or anticoagulants.    Since arrival to ED, hemoglobin was 6.3 g/dL, low MCV, 3% transferrin saturation, received 1 unit PRBC.  Hemoglobin now 7.1 g/dL.    Past Medical History:   Diagnosis Date    GERD (gastroesophageal reflux disease)     Hypercholesterolemia     Hypertension     Prostate cancer (HCC)     Retina degeneration     pt states \"can't see anymore, retinas got ate up by parasites somehow\"     Past Surgical History:   Procedure Laterality Date    HERNIA REPAIR      PROSTATE BIOPSY, NEEDLE, SATURATION SAMPLING  12/20/2022      Family History   Problem Relation Age of Onset    Cancer  positive for  - fatigue  Respiratory ROS: positive for - shortness of breath  negative for - cough, hemoptysis, sputum changes, or wheezing  Cardiovascular ROS: negative for - chest pain, edema, or loss of consciousness  Gastrointestinal ROS: negative for - abdominal pain, appetite loss, blood in stools, change in bowel habits, change in stools, constipation, diarrhea, gas/bloating, heartburn, hematemesis, melena, nausea/vomiting, or swallowing difficulty/pain      Objective:     Physical Exam:  BP (!) 142/74   Pulse 69   Temp 97.8 °F (36.6 °C) (Oral)   Resp 13   Ht 1.676 m (5' 6\")   Wt 72.6 kg (160 lb)   SpO2 96%   BMI 25.82 kg/m²      Skin:  Extremities and face reveal no rashes. No myers erythema. No telangiectasias on the chest wall.  HEENT: Sclerae anicteric. Extra-occular muscles are intact. No oral ulcers.  No abnormal pigmentation of the lips. The neck is supple.  Cardiovascular: Regular rate and rhythm. No murmurs, gallops, or rubs. PMI nondisplaced. Carotids without bruits.  Respiratory:  Comfortable breathing with no accessory muscle use. Clear breath sounds with no wheezes, rales, or rhonchi.  GI:  Abdomen nondistended, soft, and nontender.  Normal active bowel sounds. No enlargement of the liver or spleen. No masses palpable.  Rectal:  Deferred  Musculoskeletal:  No pitting edema of the lower legs. Extremities have good range of motion.  No costovertebral tenderness.  Neurological:  Gross memory appears intact.  Patient is alert and oriented.  Psychiatric:  Mood appears appropriate with judgement intact.  Lymphatic:  No cervical or supraclavicular adenopathy.    Laboratory:    Recent Results (from the past 24 hour(s))   D-Dimer, Rapid    Collection Time: 01/04/24  1:55 PM   Result Value Ref Range    D-Dimer, Quant 0.39 <0.50 ug/ml(FEU)   Vascular duplex lower extremity venous left    Collection Time: 01/04/24  2:36 PM   Result Value Ref Range    Body Surface Area 1.84 m2   Occult Blood, Fecal     NL63 by PCR Not detected NOTD      Coronavirus OC43 by PCR Not detected NOTD      SARS-CoV-2, PCR Not detected NOTD      Human Metapneumovirus by PCR Not detected NOTD      Rhinovirus Enterovirus PCR Not detected NOTD      Influenza A by PCR Not detected NOTD      Influenza B PCR Not detected NOTD      Parainfluenza 1 PCR Not detected NOTD      Parainfluenza 2 PCR Not detected NOTD      Parainfluenza 3 PCR Not detected NOTD      Parainfluenza 4 PCR Not detected NOTD      Respiratory Syncytial Virus by PCR Not detected NOTD      Bordetella parapertussis by PCR Not detected NOTD      Bordetella pertussis by PCR Not detected NOTD      Chlamydophila Pneumonia PCR Not detected NOTD      Mycoplasma pneumo by PCR Not detected NOTD     Haptoglobin    Collection Time: 01/04/24 10:02 PM   Result Value Ref Range    Haptoglobin 199 30 - 200 mg/dL   Hepatitis Panel, Acute    Collection Time: 01/04/24 10:02 PM   Result Value Ref Range    Hep A IgM NONREACTIVE NR      -        Hepatitis B Surface Ag 0.15 Index    Hep B S Ag Interp Negative NEG      -        Hep B Core Ab, IgM NONREACTIVE NR      -        Hepatitis C Ab 0.17 Index    Hep C Ab Interp NONREACTIVE NR     Iron and TIBC    Collection Time: 01/04/24 10:02 PM   Result Value Ref Range    Iron 16 (L) 35 - 150 ug/dL    TIBC 464 (H) 250 - 450 ug/dL    Iron % Saturation 3 (L) 20 - 50 %   TYPE AND SCREEN    Collection Time: 01/04/24 10:02 PM   Result Value Ref Range    Crossmatch expiration date 01/07/2024,2359     ABO/Rh A NEGATIVE     Antibody Screen NEG     Unit Number [id number]     Product Code Blood Bank RC LR     Unit Divison 00     Dispense Status Blood Bank ISSUED     Crossmatch Result Compatible    Phosphorus    Collection Time: 01/05/24  3:49 AM   Result Value Ref Range    Phosphorus 3.0 2.6 - 4.7 MG/DL   Magnesium    Collection Time: 01/05/24  3:49 AM   Result Value Ref Range    Magnesium 2.1 1.6 - 2.4 mg/dL   Comprehensive Metabolic Panel    Collection Time:

## 2024-01-05 NOTE — ED NOTES
TRANSFER - OUT REPORT:    Verbal report given to Sanjuana GALLEGO on Miguelito Ascencio  being transferred to [facility] ED for ordered procedure       Report consisted of patient's Situation, Background, Assessment and   Recommendations(SBAR).     Information from the following report(s) ED Encounter Summary, MAR, Recent Results, Med Rec Status, and Neuro Assessment was reviewed with the receiving nurse.    [place] Fall Assessment:    Presents to emergency department  because of falls (Syncope, seizure, or loss of consciousness): No  Age > 70: No  Altered Mental Status, Intoxication with alcohol or substance confusion (Disorientation, impaired judgment, poor safety awaremess, or inability to follow instructions): No  Impaired Mobility: Ambulates or transfers with assistive devices or assistance; Unable to ambulate or transer.: No  Nursing Judgement: Yes (Visual impairment)          Lines:   Peripheral IV 01/04/24 Right Antecubital (Active)   Site Assessment Clean, dry & intact 01/04/24 1354   Line Status No blood return 01/04/24 1354   Phlebitis Assessment No symptoms 01/04/24 1354   Infiltration Assessment 0 01/04/24 1354   Dressing Status New dressing applied 01/04/24 1354   Dressing Type Transparent 01/04/24 1354   Dressing Intervention New 01/04/24 1354        Opportunity for questions and clarification was provided.      Patient transported with:  Monitor

## 2024-01-05 NOTE — ED NOTES
Report given to Mary GALLEGO  by JULIÁN Wei Report included the following information SBAR,  Recent Results

## 2024-01-05 NOTE — ED NOTES
Blood consent signed at this time. Pt agrees to blood transfusion. States they have never had one before

## 2024-01-05 NOTE — ED NOTES
Blood bank called at this time to ask for another pink top. Tube sent down at this time.     Blood released but waiting for type and screen

## 2024-01-05 NOTE — ED NOTES
Patient returned from restroom, patient placed back on monitor, urinal provided, advised patient to use urinal rather than to ambulate to the restroom at this time     [place] socks placed     Callbell within reach, advised to call for assistance if needed

## 2024-01-06 LAB
ABO + RH BLD: NORMAL
BLD PROD TYP BPU: NORMAL
BLOOD BANK DISPENSE STATUS: NORMAL
BLOOD GROUP ANTIBODIES SERPL: NORMAL
BPU ID: NORMAL
CROSSMATCH RESULT: NORMAL
SPECIMEN EXP DATE BLD: NORMAL
UNIT DIVISION: 0

## 2024-01-06 NOTE — PLAN OF CARE
Problem: ABCDS Injury Assessment  Goal: Absence of physical injury  Outcome: HH/HSPC Resolved Met     Problem: Discharge Planning  Goal: Discharge to home or other facility with appropriate resources  Outcome: HH/HSPC Resolved Met     Problem: Safety - Adult  Goal: Free from fall injury  Outcome: HH/HSPC Resolved Met

## 2024-01-06 NOTE — PROGRESS NOTES
Nurse handed patient a copy of discharge instructions which have been read and explained to patient. New medications were read and explained to patient, patient verbalized understanding. Patient aware that prescriptions have been electronically sent to their pharmacy. Opportunity for questions and clarification offered. Removed patient's IV access with no complications. Vital signs stable. Patient sent with all belongings.

## 2024-01-06 NOTE — DISCHARGE SUMMARY
[facility]  [address] [place] [address]  Tel: [phone]    Hospital Medicine Discharge Summary    Patient ID:    Miguelito Ascencio  Age:              68 y.o.    : 1955  MRN:             [id number]     PCP: Bonilla Maki MD     Date of Admission: 2024    Date of Discharge:  2024    Discharge Diagnoses:  Principal Problem:    Microcytic anemia    Hypertension    Hypercholesterolemia    Current smoker    Poor vision    Elevated PSA    Prostate cancer (HCC)    GERD (gastroesophageal reflux disease)    Retina degeneration    Dyspnea     Reason for admission:    Shortness of breath [R06.02]  Anemia [D64.9]  Symptomatic anemia [D64.9]    Diagnostic testing:    Laboratory data reviewed and independently interpreted:    Recent Labs     24  1315 24  0349 24  1209   WBC 5.5 7.5  --    HGB 6.3* 7.1* 7.3*   HCT 23.9* 25.1* 26.1*   RBC 3.60* 3.62*  --    MCV 66.4* 69.3*  --    MCH 17.5* 19.6*  --     273  --      No results found for: \"LACTA\"  Recent Labs     24  1315 24  1340 24  0349     --  143   K 3.9  --  3.8     --  117*   CO2 23  --  19*   GLUCOSE 113*  --  116*   BUN 14  --  12   CREATININE 0.74  --  1.12   CALCIUM 8.9  --  7.8*   MG  --  2.1 2.1   PHOS  --  3.3 3.0   PROT 7.4  --  6.1*   BILITOT 0.4  --  0.9   ALKPHOS 94  --  73   AST 22  --  22   ALT 18  --  18     No components found for: \"GLUCOSEPOC\"  Lab Results   Component Value Date/Time    CHOL 112 2024 01:40 PM    TRIG 116 2024 01:40 PM    HDL 42 2024 01:40 PM    LDLCALC 46.8 2024 01:40 PM    LABVLDL 23.2 2024 01:40 PM       Imaging data reviewed:    XR CHEST (2 VW)    Result Date: 2024  No acute process.       Hospital Course:     Mr. Ascencio is a 68 y.o. admitted to [facility] and treated for the following:    Microcytic anemia POA: admitted with symptoms. Iron low on

## 2024-01-06 NOTE — DISCHARGE INSTRUCTIONS
Hospital Medicine DISCHARGE INSTRUCTIONS    NAME:   Miguelito Ascencio   :  1955   MRN:  [id number]     Date:     2024    ADMIT DATE: 2024     DISCHARGE DATE: 2024     PRINCIPAL ADMITTING DIAGNOSIS:  Shortness of breath [R06.02]  Anemia [D64.9]  Symptomatic anemia [D64.9]    Discharge Diagnoses:  Microcytic anemia    MEDICATIONS:    It is important that medications are taken exactly as they are prescribed on the discharge medication instructions and keep them your  in the bottles provided by the pharmacist.   Keep a list of the medication names, dosages, and times to be taken at all times.    Do not take other medications without consulting your doctor.   Avoid alcohol, NSAIDs    Recommended diet:  regular diet    Recommended activity: activity as tolerated    Post discharge care:    Notify follow up health care provider or return to the emergency department if you cannot get hold of your doctor if you feel worse or experience symptoms similar to those that brought you to [facility]  [address] [place] [address]   [facility] [address] [phone]  Follow up  Office will call you to schedule endoscopic testing    Bonilla Maki MD  [address] [address] [address] [phone]    Schedule an appointment as soon as possible for a visit  to schedule a regular follow up after discharge       Information obtained by :  I understand that if any problems occur once I am at home I am to contact my physician and I understand and acknowledge receipt of the instructions indicated above.                                                                                                                                           Physician's or R.N.'s Signature                                                                  Date/Time                                                                                                                                               Patient or Representative Signature                                                          Date/Time

## 2024-01-08 NOTE — ADT AUTH CERT
Tyler Edge MD  Physician  Internal Medicine  Progress Notes      Addendum  Date of Service:  2024  7:45 AM                                                                                                                                                                                                                                                                                                                                                                                             [facility]  [address] [place] [address] [phone]        Hospitalist  Progress Note        NAME:       Miguelito Ascencio          :           1955  MRM:           [id number]     Date of service: 2024       Subjective: Patient seen and examined by me. Patient admitted with severe anemia. He says he cannot tell if he has melena given he is blind. No abdominal discomfort. No chest pain or SOB.       Objective:     Vital Signs:     BP (!) 132/104   Pulse 88   Temp 97.8 °F (36.6 °C) (Oral)   Resp 16   Ht 1.676 m (5' 6\")   Wt 72.6 kg (160 lb)   SpO2 100%   BMI 25.82 kg/m²          Intake/Output Summary (Last 24 hours) at 2024 0746  Last data filed at 2024 0338      Gross per 24 hour   Intake 281.67 ml   Output --   Net 281.67 ml         Current inpatient medications reviewed:         Current Facility-Administered Medications   Medication Dose Route Frequency    pantoprazole (PROTONIX) 40 mg in sodium chloride (PF) 0.9 % 10 mL injection  40 mg IntraVENous Q12H    sodium chloride flush 0.9 % injection 5-40 mL  5-40 mL IntraVENous 2 times per day    sodium chloride flush 0.9 % injection 5-40 mL  5-40 mL IntraVENous PRN    0.9 % sodium chloride infusion   IntraVENous PRN    potassium chloride (KLOR-CON) extended release tablet 40 mEq  40 mEq Oral PRN     Or    potassium bicarb-citric acid (EFFER-K) effervescent tablet 40 mEq  40 mEq Oral PRN     Or    potassium  01/04/24  1315 01/05/24  0349   WBC 5.5 7.5   HGB 6.3* 7.1*   HCT 23.9* 25.1*   RBC 3.60* 3.62*   MCV 66.4* 69.3*   MCH 17.5* 19.6*    273      No results found for: \"LACTA\"        Recent Labs     01/04/24  1315 01/04/24  1340 01/05/24  0349     --  143   K 3.9  --  3.8     --  117*   CO2 23  --  19*   GLUCOSE 113*  --  116*   BUN 14  --  12   CREATININE 0.74  --  1.12   CALCIUM 8.9  --  7.8*   MG  --  2.1 2.1   PHOS  --  3.3 3.0   PROT 7.4  --  6.1*   BILITOT 0.4  --  0.9   ALKPHOS 94  --  73   AST 22  --  22   ALT 18  --  18      No components found for: \"GLUCOSEPOC\"        Lab Results   Component Value Date/Time     CHOL 112 01/04/2024 01:40 PM     TRIG 116 01/04/2024 01:40 PM     HDL 42 01/04/2024 01:40 PM     LDLCALC 46.8 01/04/2024 01:40 PM     LABVLDL 23.2 01/04/2024 01:40 PM         Imaging data reviewed:     XR CHEST (2 VW)     Result Date: 1/4/2024  No acute process.      Imaging studies reviewed and reports noted, Telemetry reviewed and independently interpreted by me and available notes from other care providers - all reviewed by me on: 1/5/2024     Assessment and Plan:     Microcytic anemia POA: admitted with symptoms. Iron low on serologies. He is sp transfusion with 1u pRBCs and Hgb is 7.1. Likely to drop further. Check stool for occult blood. Continue IV Venofer, IV Pantoprazole and consult GI for endoscopic testing. Monitor and transfuse to keep Hgb > 7      Hypertension / Hypercholesterolemia POA: Hold Losartan and Lipitor for now.     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 Outpatient follow up     Prostate cancer / Elevated PSA POA: outpatient follow up with Urology     GERD (gastroesophageal reflux disease) POA: continue Pantoprazole     Care Plan discussed with: Patient, Nursing, CM      Prophylaxis:  H2B/PPI                                                                                                             Anticipated Disposition:  Home w/Family     PCP:

## 2024-01-11 RX ORDER — OMEPRAZOLE 20 MG/1
20 CAPSULE, DELAYED RELEASE ORAL DAILY
COMMUNITY

## 2024-01-11 NOTE — FLOWSHEET NOTE
[facility]  Endoscopy Preprocedure Instructions      1. On the day of your surgery, please report to registration located on the 2nd floor of the  the Main Hospital. yes    2. You must have a responsible adult to drive you to the hospital, stay at the hospital during your procedure and drive you home. If they leave your procedure will not be started (no exceptions). yes    3. Do not have anything to eat or drink (including water, gum, mints, coffee, and juice) after midnight. This does not apply to the medications you were instructed to take by your physician.yes  If you are currently taking Plavix, Coumadin, Aspirin, or other blood-thinning agents, contact your physician for special instructions. not applicable    4. If you are having a procedure that requires bowel prep: We recommend that you have only clear liquids the day before your procedure and begin your bowel prep by 5:00 pm.  You may continue to drink clear liquids until midnight.  If for any reason you are not able to complete your prep please notify your physician immediately. yes    5. Have a list of all current medications, including vitamins, herbal supplements and any other over the counter medications. reviewed    6. If you wear glasses, contacts, dentures and/or hearing aids, they may be removed prior to procedure, please bring a case to store them in. not applicable    7. You should understand that if you do not follow these instructions your procedure may be cancelled.  If your physical condition changes (I.e. fever, cold or flu) please contact your doctor as soon as possible.    8. It is important that you be on time.  If for any reason you are unable to keep your appointment please call [phone] the day of or your physician’s office prior to your scheduled procedure    9. Have you received your COVID Vaccine? no If no, you will need to receive a COVID test/swab here at [facility] the MOB parking lot Monday - Friday 8a -

## 2024-01-12 ENCOUNTER — ANESTHESIA (OUTPATIENT)
Facility: HOSPITAL | Age: 69
End: 2024-01-12
Payer: MEDICARE

## 2024-01-12 ENCOUNTER — HOSPITAL ENCOUNTER (OUTPATIENT)
Facility: HOSPITAL | Age: 69
Setting detail: OUTPATIENT SURGERY
Discharge: HOME | End: 2024-01-12
Attending: SPECIALIST | Admitting: SPECIALIST
Payer: MEDICARE

## 2024-01-12 ENCOUNTER — ANESTHESIA EVENT (OUTPATIENT)
Facility: HOSPITAL | Age: 69
End: 2024-01-12
Payer: MEDICARE

## 2024-01-12 VITALS
WEIGHT: 165.57 LBS | RESPIRATION RATE: 18 BRPM | SYSTOLIC BLOOD PRESSURE: 106 MMHG | DIASTOLIC BLOOD PRESSURE: 62 MMHG | OXYGEN SATURATION: 99 % | HEART RATE: 72 BPM | BODY MASS INDEX: 25.99 KG/M2 | HEIGHT: 67 IN | TEMPERATURE: 97.4 F

## 2024-01-12 PROCEDURE — 3600007502: Performed by: SPECIALIST

## 2024-01-12 PROCEDURE — 6360000002 HC RX W HCPCS: Performed by: NURSE ANESTHETIST, CERTIFIED REGISTERED

## 2024-01-12 PROCEDURE — 2580000003 HC RX 258: Performed by: SPECIALIST

## 2024-01-12 PROCEDURE — 3700000001 HC ADD 15 MINUTES (ANESTHESIA): Performed by: SPECIALIST

## 2024-01-12 PROCEDURE — 7100000010 HC PHASE II RECOVERY - FIRST 15 MIN: Performed by: SPECIALIST

## 2024-01-12 PROCEDURE — 2709999900 HC NON-CHARGEABLE SUPPLY: Performed by: SPECIALIST

## 2024-01-12 PROCEDURE — 3700000000 HC ANESTHESIA ATTENDED CARE: Performed by: SPECIALIST

## 2024-01-12 PROCEDURE — 88305 TISSUE EXAM BY PATHOLOGIST: CPT

## 2024-01-12 PROCEDURE — 7100000011 HC PHASE II RECOVERY - ADDTL 15 MIN: Performed by: SPECIALIST

## 2024-01-12 PROCEDURE — 3600007512: Performed by: SPECIALIST

## 2024-01-12 RX ORDER — LIDOCAINE HYDROCHLORIDE 20 MG/ML
INJECTION, SOLUTION INTRAVENOUS PRN
Status: DISCONTINUED | OUTPATIENT
Start: 2024-01-12 | End: 2024-01-12 | Stop reason: SDUPTHER

## 2024-01-12 RX ORDER — SODIUM CHLORIDE 0.9 % (FLUSH) 0.9 %
5-40 SYRINGE (ML) INJECTION PRN
Status: DISCONTINUED | OUTPATIENT
Start: 2024-01-12 | End: 2024-01-12 | Stop reason: HOSPADM

## 2024-01-12 RX ORDER — SODIUM CHLORIDE 9 MG/ML
INJECTION, SOLUTION INTRAVENOUS CONTINUOUS
Status: DISCONTINUED | OUTPATIENT
Start: 2024-01-12 | End: 2024-01-12 | Stop reason: HOSPADM

## 2024-01-12 RX ORDER — PROPOFOL 10 MG/ML
INJECTION, EMULSION INTRAVENOUS PRN
Status: DISCONTINUED | OUTPATIENT
Start: 2024-01-12 | End: 2024-01-12 | Stop reason: SDUPTHER

## 2024-01-12 RX ADMIN — LIDOCAINE HYDROCHLORIDE 80 MG: 20 INJECTION, SOLUTION INTRAVENOUS at 07:31

## 2024-01-12 RX ADMIN — PROPOFOL 50 MG: 10 INJECTION, EMULSION INTRAVENOUS at 07:33

## 2024-01-12 RX ADMIN — PROPOFOL 50 MG: 10 INJECTION, EMULSION INTRAVENOUS at 07:43

## 2024-01-12 RX ADMIN — PROPOFOL 120 MCG/KG/MIN: 10 INJECTION, EMULSION INTRAVENOUS at 07:32

## 2024-01-12 RX ADMIN — PROPOFOL 100 MG: 10 INJECTION, EMULSION INTRAVENOUS at 07:31

## 2024-01-12 RX ADMIN — SODIUM CHLORIDE: 9 INJECTION, SOLUTION INTRAVENOUS at 07:26

## 2024-01-12 RX ADMIN — PROPOFOL 50 MG: 10 INJECTION, EMULSION INTRAVENOUS at 07:38

## 2024-01-12 ASSESSMENT — PAIN - FUNCTIONAL ASSESSMENT: PAIN_FUNCTIONAL_ASSESSMENT: 0-10

## 2024-01-12 ASSESSMENT — ENCOUNTER SYMPTOMS
DYSPNEA ACTIVITY LEVEL: AFTER AMBULATING 1 FLIGHT OF STAIRS
SHORTNESS OF BREATH: 1

## 2024-01-12 NOTE — H&P
Pre-Endoscopy H&P Update  Chief complaint/HPI/ROS:  The indication for the procedure, the patient's history and the patient's current medications are reviewed prior to the procedure and that data is reported on the H&P to which this document is attached.  Any significant complaints with regard to organ systems will be noted, and if not mentioned then a review of systems is not contributory.  Past Medical History:   Diagnosis Date    Anemia     Cabral's palsy     \"as a baby\"    Borderline diabetes     GERD (gastroesophageal reflux disease)     Hearing loss     right    Heart murmur     History of blood transfusion     no reaction    Hypercholesterolemia     Hypertension     Prostate cancer (HCC)     Retina degeneration     pt states \"can't see anymore, retinas got ate up by parasites somehow\"      Past Surgical History:   Procedure Laterality Date    HERNIA REPAIR      PROSTATE BIOPSY, NEEDLE, SATURATION SAMPLING  2022     Social   Social History     Tobacco Use    Smoking status: Former     Current packs/day: 0.00     Types: Cigarettes     Quit date: 2023     Years since quittin.1    Smokeless tobacco: Never    Tobacco comments:     Quit smoking cigarettes 2023   Substance Use Topics    Alcohol use: Yes     Alcohol/week: 4.0 standard drinks of alcohol     Comment: 4-5 beers occasionally      Family History   Problem Relation Age of Onset    Cancer Mother         esophagus    Pacemaker Father     Cancer Father         lung    Breast Cancer Sister     Heart Surgery Sister         bypass    Other Sister         liver and kidneys shut down - unsure of reason      No Known Allergies   Prior to Admission Medications   Prescriptions Last Dose Informant Patient Reported? Taking?   atorvastatin (LIPITOR) 40 MG tablet 2024  Yes No   Sig: Take 1 tablet by mouth daily   ferrous sulfate (IRON 325) 325 (65 Fe) MG tablet 2024  No No   Sig: Take 1 tablet by mouth daily (with breakfast)   losartan (COZAAR)

## 2024-01-12 NOTE — H&P
68 y.o. male for open access colonoscopy for screening   Additional data for completion of the targeted pre-endoscopy H&P will be provided under 'H&P interval notes'.  Please see that document which will be attached to this.  Jason Aguilera MD    Severe Iron Def Anemia for EGD/colon.

## 2024-01-12 NOTE — ANESTHESIA POSTPROCEDURE EVALUATION
Department of Anesthesiology  Postprocedure Note    Patient: Miguelito Ascencio  MRN: [id number]  YOB: 1955  Date of evaluation: 1/12/2024    Procedure Summary       Date: 01/12/24 Room / Location: [place] [id number] / [facility] ENDOSCOPY    Anesthesia Start: 0726 Anesthesia Stop: 0758    Procedures:       COLONOSCOPY, EGD (Lower GI Region)      EGD BIOPSY (Upper GI Region)      COLONOSCOPY POLYPECTOMY SNARE/COLD BIOPSY (Lower GI Region) Diagnosis:       Iron deficiency anemia, unspecified iron deficiency anemia type      (Iron deficiency anemia, unspecified iron deficiency anemia type [D50.9])    Surgeons: Jason Aguilera MD Responsible Provider: Emerson Sanchez MD    Anesthesia Type: MAC ASA Status: 3            Anesthesia Type: No value filed.    Faisal Phase I: Faisal Score: 10    Faisal Phase II: Faisal Score: 7    Anesthesia Post Evaluation    Patient location during evaluation: PACU  Level of consciousness: awake and alert  Pain score: 0  Airway patency: patent  Nausea & Vomiting: no vomiting and no nausea  Cardiovascular status: hemodynamically stable  Respiratory status: room air and spontaneous ventilation  Hydration status: stable  There was medical reason for not using a multimodal analgesia pain management approach.    No notable events documented.

## 2024-01-12 NOTE — OP NOTE
[facility]  Formerly [facility]  Jason Aguilera MD  [phone]      2024    Esophagogastroduodenoscopy & Colonoscopy Procedure Note  Miguelito Ascencio  : 1955  [facility] Medical Record Number: [id number]      Indications:   Anemia, microcytic Iron deficiency and no gross or overt GI blood loss.    Referring Physician:  None, None  Anesthesia/Sedation: Conscious Sedation/Moderate Sedation/MAC  Endoscopist:  Dr. Jason Aguilera  Complications:  None  Estimated Blood Loss:  None    Permit:  The indications, risks, benefits and alternatives were reviewed with the patient or their decision maker who was provided an opportunity to ask questions and all questions were answered.  The specific risks of esophagogastroduodenoscopy with conscious sedation were reviewed, including but not limited to anesthetic complication, bleeding, adverse drug reaction, missed lesion, infection, IV site reactions, and intestinal perforation which would lead to the need for surgical repair.  Alternatives to EGD and colonoscopy including radiographic imaging, observation without testing, or laboratory testing were reviewed as well as the limitations of those alternatives discussed.  After considering the options and having all their questions answered, the patient or their decision maker provided both verbal and written consent to proceed.      -----------EGD------------   Procedure in Detail:  After obtaining informed consent, positioning of the patient in the left lateral decubitus position, and conduction of a pre-procedure pause or \"time out\" the endoscope was introduced into the mouth and advanced to the duodenum.  A careful inspection was made, and findings or interventions are described below.    Findings:   Esophagus:Small hiatal hernia.  Stomach: normal   Duodenum/jejunum: normal.  Cold forceps biopsies of the duodenum taken for

## 2024-01-12 NOTE — DISCHARGE INSTRUCTIONS
[facility]  [facility]  Jason Kumar MD  [phone]      January 12, 2024    Miguelito Ascencio  YOB: 1955    COLONOSCOPY DISCHARGE INSTRUCTIONS    If there is redness at IV site you should apply warm compress to area.  If redness or soreness persist contact Dr. Kumar' or your primary care doctor.    There may be a slight amount of blood passed from the rectum.  Gaseous discomfort may develop, but walking, belching will help relieve this.  You may not operate a vehicle for 12 hours  You may not operate machinery or dangerous appliances for rest of today  You may not drink alcoholic beverages for 12 hours  Avoid making any critical decisions for 24 hours    DIET:  You may resume your normal diet, but some patients find that heavy or large meals may lead to indigestion or vomiting.  I suggest a light meal as first food intake.    MEDICATIONS:  The use of some over-the-counter pain medication may lead to bleeding after colon biopsies or polyp removal.  Tylenol (also called acetaminophen) is safe to take even if you have had colonoscopy with polyp removal.  Based on the procedure you had today you may not safely take aspirin or aspirin-like products for the next ten (10) days.  Remember that Tylenol (also called acetaminophen) is safe to take after colonoscopy even if you have had biopsies or polyps removed.    ACTIVITY:  You may resume your normal household activities, but it is recommended that you spend the remainder of the day resting -  avoid any strenuous activity.    CALL DR. KUMAR' OFFICE IF:  Increasing pain, nausea, vomiting  Abdominal distension (swelling)  Significant new or increased bleeding (oral or rectal)  Fever/Chills  Chest pain/shortness of breath                       Additional instructions:   No aspirin 10 days.  Good news, no bleeding, ulcer, or cancer.  We found a small colon polyp and removed that and  I took biopsies from the small intestine to see if there is a problem there leading to your anemia.  My office will contact to arrange follow up in my clinic to get repeat blood testing and decide if further testing is required.  Continue iron pills, once daily, every day, until we meet again.     It was an honor to be your doctor today.  Please let me or my office staff know if you have any feedback about today's procedure.    Bipin Aguilera MD    Colonoscopy saves lives, and can prevent colon cancer.  Everyone aged 50 or older needs colonoscopy.  Tell your family and friends: get the test!

## 2024-01-12 NOTE — PROGRESS NOTES

## 2024-01-12 NOTE — ANESTHESIA PRE PROCEDURE
Department of Anesthesiology  Preprocedure Note       Name:  Miguelito Ascencio   Age:  68 y.o.  :  1955                                          MRN:  [id number]         Date:  2024      Surgeon: Surgeon(s):  Jason Aguilera MD    Procedure: Procedure(s):  COLONOSCOPY, EGD  .    Medications prior to admission:   Prior to Admission medications    Medication Sig Start Date End Date Taking? Authorizing Provider   omeprazole (PRILOSEC) 20 MG delayed release capsule Take 1 capsule by mouth daily   Yes Provider, MD Randy   ferrous sulfate (IRON 325) 325 (65 Fe) MG tablet Take 1 tablet by mouth daily (with breakfast) 24   Tyler Edge MD   atorvastatin (LIPITOR) 40 MG tablet Take 1 tablet by mouth daily 22   Automatic Reconciliation, Ar   losartan (COZAAR) 25 MG tablet Take 1 tablet by mouth daily 22   Automatic Reconciliation, Ar       Current medications:    No current facility-administered medications for this encounter.       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   • Dyspnea R06.00       Past Medical History:        Diagnosis Date   • Anemia    • Bell's palsy     \"as a baby\"   • Borderline diabetes    • GERD (gastroesophageal reflux disease)    • Hearing loss     right   • Heart murmur    • History of blood transfusion     no reaction   • Hypercholesterolemia    • Hypertension    • Prostate cancer (HCC)    • Retina degeneration     pt states \"can't see anymore, retinas got ate up by parasites somehow\"       Past Surgical History:        Procedure Laterality Date   • HERNIA REPAIR     • PROSTATE BIOPSY, NEEDLE, SATURATION SAMPLING  2022       Social History:

## 2024-02-20 ENCOUNTER — OFFICE VISIT (OUTPATIENT)
Dept: PRIMARY CARE CLINIC | Facility: CLINIC | Age: 69
End: 2024-02-20
Payer: MEDICARE

## 2024-02-20 VITALS
BODY MASS INDEX: 26.49 KG/M2 | RESPIRATION RATE: 18 BRPM | HEIGHT: 67 IN | HEART RATE: 86 BPM | WEIGHT: 168.8 LBS | OXYGEN SATURATION: 98 % | TEMPERATURE: 98 F | SYSTOLIC BLOOD PRESSURE: 124 MMHG | DIASTOLIC BLOOD PRESSURE: 80 MMHG

## 2024-02-20 DIAGNOSIS — D50.9 IRON DEFICIENCY ANEMIA, UNSPECIFIED IRON DEFICIENCY ANEMIA TYPE: Primary | ICD-10-CM

## 2024-02-20 DIAGNOSIS — L57.0 ACTINIC KERATOSES: ICD-10-CM

## 2024-02-20 DIAGNOSIS — E78.5 HYPERLIPIDEMIA, UNSPECIFIED HYPERLIPIDEMIA TYPE: ICD-10-CM

## 2024-02-20 DIAGNOSIS — I10 PRIMARY HYPERTENSION: ICD-10-CM

## 2024-02-20 DIAGNOSIS — J30.2 SEASONAL ALLERGIC RHINITIS, UNSPECIFIED TRIGGER: ICD-10-CM

## 2024-02-20 LAB
BACTERIA URINE, POC: NORMAL
BILIRUBIN, URINE, POC: NEGATIVE
BLOOD URINE, POC: NEGATIVE
CASTS URINE, POC: NORMAL
EPI CELLS URINE, POC: NORMAL
GLUCOSE URINE, POC: NEGATIVE
KETONES, URINE, POC: NEGATIVE
LEUKOCYTE ESTERASE, URINE, POC: NEGATIVE
NITRITE, URINE, POC: NEGATIVE
PH, URINE, POC: 6 (ref 4.6–8)
PROTEIN,URINE, POC: NEGATIVE
RBC, URINE, POC: NORMAL
SPECIFIC GRAVITY, URINE, POC: 1.02 (ref 1–1.03)
TRICHOMONAS URINE, POC: NORMAL
URINALYSIS CLARITY, POC: CLEAR
URINALYSIS COLOR, POC: YELLOW
UROBILINOGEN, POC: NORMAL
WBC, URINE, POC: NORMAL
YEAST, URINE, POC: NORMAL

## 2024-02-20 PROCEDURE — 3079F DIAST BP 80-89 MM HG: CPT | Performed by: PREVENTIVE MEDICINE

## 2024-02-20 PROCEDURE — 1123F ACP DISCUSS/DSCN MKR DOCD: CPT | Performed by: PREVENTIVE MEDICINE

## 2024-02-20 PROCEDURE — G8419 CALC BMI OUT NRM PARAM NOF/U: HCPCS | Performed by: PREVENTIVE MEDICINE

## 2024-02-20 PROCEDURE — 99214 OFFICE O/P EST MOD 30 MIN: CPT | Performed by: PREVENTIVE MEDICINE

## 2024-02-20 PROCEDURE — G8427 DOCREV CUR MEDS BY ELIG CLIN: HCPCS | Performed by: PREVENTIVE MEDICINE

## 2024-02-20 PROCEDURE — G8484 FLU IMMUNIZE NO ADMIN: HCPCS | Performed by: PREVENTIVE MEDICINE

## 2024-02-20 PROCEDURE — 81000 URINALYSIS NONAUTO W/SCOPE: CPT | Performed by: PREVENTIVE MEDICINE

## 2024-02-20 PROCEDURE — 3017F COLORECTAL CA SCREEN DOC REV: CPT | Performed by: PREVENTIVE MEDICINE

## 2024-02-20 PROCEDURE — 4004F PT TOBACCO SCREEN RCVD TLK: CPT | Performed by: PREVENTIVE MEDICINE

## 2024-02-20 PROCEDURE — 3074F SYST BP LT 130 MM HG: CPT | Performed by: PREVENTIVE MEDICINE

## 2024-02-20 RX ORDER — ATORVASTATIN CALCIUM 40 MG/1
40 TABLET, FILM COATED ORAL DAILY
Qty: 90 TABLET | Refills: 0 | Status: SHIPPED | OUTPATIENT
Start: 2024-02-20

## 2024-02-20 RX ORDER — LOSARTAN POTASSIUM 25 MG/1
25 TABLET ORAL DAILY
Qty: 90 TABLET | Refills: 0 | Status: SHIPPED | OUTPATIENT
Start: 2024-02-20

## 2024-02-20 ASSESSMENT — PATIENT HEALTH QUESTIONNAIRE - PHQ9
1. LITTLE INTEREST OR PLEASURE IN DOING THINGS: 0
SUM OF ALL RESPONSES TO PHQ QUESTIONS 1-9: 0
SUM OF ALL RESPONSES TO PHQ QUESTIONS 1-9: 0
SUM OF ALL RESPONSES TO PHQ9 QUESTIONS 1 & 2: 0
2. FEELING DOWN, DEPRESSED OR HOPELESS: 0
SUM OF ALL RESPONSES TO PHQ QUESTIONS 1-9: 0
SUM OF ALL RESPONSES TO PHQ QUESTIONS 1-9: 0

## 2024-02-20 ASSESSMENT — ENCOUNTER SYMPTOMS
RHINORRHEA: 1
SHORTNESS OF BREATH: 1
COUGH: 0
WHEEZING: 0

## 2024-02-20 NOTE — PROGRESS NOTES
\"Have you been to the ER, urgent care clinic since your last visit?  Hospitalized since your last visit?\"    YES - When: approximately 1 months ago.  Where and Why: colonoscopy/ endoscopy.    “Have you seen or consulted any other health care providers outside of [facility] since your last visit?” NO

## 2024-02-20 NOTE — PROGRESS NOTES
Miguelito Ascencio is a 69 y.o. male who was seen in clinic today (2/20/2024).    Assessment & Plan:   Below is the assessment and plan developed based on review of pertinent history, physical exam, labs, studies, and medications.    1. Iron deficiency anemia, unspecified iron deficiency anemia type  -     CBC with Auto Differential  -     Ferritin  -     AMB POC URINALYSIS DIP STICK MANUAL W/ MICRO [facility]  -     CELIAC PANEL REFLEX TO TITER  2. Hyperlipidemia, unspecified hyperlipidemia type  -     atorvastatin (LIPITOR) 40 MG tablet; Take 1 tablet by mouth daily, Disp-90 tablet, R-0Normal  3. Actinic keratoses  -     External Referral To Dermatology  4. Seasonal allergic rhinitis, unspecified trigger  5. Primary hypertension  -     losartan (COZAAR) 25 MG tablet; Take 1 tablet by mouth daily, Disp-90 tablet, R-0Normal      Patient Instructions   [facility] -  Take 1 tab 1-2 times/d as needed  [facility] - pseudoephedrine hcl and triprolidine. Ask for it behind the pharmacist's desk.      No follow-ups on file.    Subjective:   Miguelito was seen today for follow-up. He says he had his EGD and Colonoscopy and only a polyp was found. He has no ulcers. He says he cannot see well enough to know if he is having any blood in his urine. It seems there has been no discussion regarding testing for celiac disease. He is taking oral iron supplements and says he feels much better than he previously did in January. He does take omeprazole for GERD, which is well controlled. He does have some SOB; he says he has 2  heart murmurs. He says he tries not to overexert himself. He is currently smoking 2-3 cigarettes a day.       Review of Systems   HENT:  Positive for rhinorrhea.    Respiratory:  Positive for shortness of breath. Negative for cough and wheezing.    Cardiovascular: Negative.    Skin:         He is complaining of a lesion on his back that partially came off this past week. He has several on his back. He is also

## 2024-02-20 NOTE — PATIENT INSTRUCTIONS
[facility] -  Take 1 tab 1-2 times/d as needed  [facility] - pseudoephedrine hcl and triprolidine. Ask for it behind the pharmacist's desk.

## 2024-02-21 ENCOUNTER — OFFICE VISIT (OUTPATIENT)
Age: 69
End: 2024-02-21
Payer: MEDICARE

## 2024-02-21 VITALS
DIASTOLIC BLOOD PRESSURE: 78 MMHG | WEIGHT: 167 LBS | SYSTOLIC BLOOD PRESSURE: 143 MMHG | OXYGEN SATURATION: 98 % | HEIGHT: 67 IN | HEART RATE: 98 BPM | TEMPERATURE: 97.8 F | RESPIRATION RATE: 18 BRPM | BODY MASS INDEX: 26.21 KG/M2

## 2024-02-21 DIAGNOSIS — F17.200 TOBACCO DEPENDENCE: ICD-10-CM

## 2024-02-21 DIAGNOSIS — D50.0 IRON DEFICIENCY ANEMIA DUE TO CHRONIC BLOOD LOSS: Primary | ICD-10-CM

## 2024-02-21 LAB
CHOLEST SERPL-MCNC: 153 MG/DL (ref 100–199)
FERRITIN SERPL-MCNC: 52 NG/ML (ref 30–400)
HDLC SERPL-MCNC: 53 MG/DL
LDLC SERPL CALC-MCNC: 79 MG/DL (ref 0–99)
TRIGL SERPL-MCNC: 118 MG/DL (ref 0–149)
VLDLC SERPL CALC-MCNC: 21 MG/DL (ref 5–40)

## 2024-02-21 PROCEDURE — 3017F COLORECTAL CA SCREEN DOC REV: CPT | Performed by: INTERNAL MEDICINE

## 2024-02-21 PROCEDURE — 99204 OFFICE O/P NEW MOD 45 MIN: CPT | Performed by: INTERNAL MEDICINE

## 2024-02-21 PROCEDURE — G8419 CALC BMI OUT NRM PARAM NOF/U: HCPCS | Performed by: INTERNAL MEDICINE

## 2024-02-21 PROCEDURE — 3078F DIAST BP <80 MM HG: CPT | Performed by: INTERNAL MEDICINE

## 2024-02-21 PROCEDURE — G8484 FLU IMMUNIZE NO ADMIN: HCPCS | Performed by: INTERNAL MEDICINE

## 2024-02-21 PROCEDURE — G8427 DOCREV CUR MEDS BY ELIG CLIN: HCPCS | Performed by: INTERNAL MEDICINE

## 2024-02-21 PROCEDURE — 1123F ACP DISCUSS/DSCN MKR DOCD: CPT | Performed by: INTERNAL MEDICINE

## 2024-02-21 PROCEDURE — 3077F SYST BP >= 140 MM HG: CPT | Performed by: INTERNAL MEDICINE

## 2024-02-21 PROCEDURE — 4004F PT TOBACCO SCREEN RCVD TLK: CPT | Performed by: INTERNAL MEDICINE

## 2024-02-21 ASSESSMENT — PATIENT HEALTH QUESTIONNAIRE - PHQ9
SUM OF ALL RESPONSES TO PHQ QUESTIONS 1-9: 0
SUM OF ALL RESPONSES TO PHQ9 QUESTIONS 1 & 2: 0
SUM OF ALL RESPONSES TO PHQ QUESTIONS 1-9: 0
2. FEELING DOWN, DEPRESSED OR HOPELESS: 0
1. LITTLE INTEREST OR PLEASURE IN DOING THINGS: 0

## 2024-02-21 NOTE — PROGRESS NOTES
[facility]  [address], [address] [facility] [address]  W: [phone]  F: [phone] Patient ID  Name: Miguelito Ascencio  YOB: 1955  MRN: [id number]  Referring Provider:   Hina Benjamin DO  [address] [place] [address]  Primary Care Provider:   None, None       HEMATOLOGY/MEDICAL ONCOLOGY  NOTE     Reason for Evaluation:     Chief Complaint   Patient presents with    New Patient       Subjective:     History of Present Illness:   Date of Visit: 02/21/24   Miguelito Ascencio is a 69 y.o. male who presents for an initial evaluation for iron deficiency anemia.    Patient reports feeling well and notes that he thought he was seeing us to have skin tags removed. He reports feeling better on oral iron. Denies any overt bleeding complaints. He had a colonoscopy and EGD with DR. Aguilera in January 2024. His main complaint is chornic vision issues.     Past Medical History:   Diagnosis Date    Anemia     Cabral's palsy     \"as a baby\"    Borderline diabetes     GERD (gastroesophageal reflux disease)     Hearing loss     right    Heart murmur     History of blood transfusion     no reaction    Hypercholesterolemia     Hypertension     Prostate cancer (HCC)     Retina degeneration     pt states \"can't see anymore, retinas got ate up by parasites somehow\"      Past Surgical History:   Procedure Laterality Date    COLONOSCOPY N/A 1/12/2024    COLONOSCOPY, EGD performed by Jason Aguilera MD at [facility] ENDOSCOPY    COLONOSCOPY N/A 1/12/2024    COLONOSCOPY POLYPECTOMY SNARE/COLD BIOPSY performed by Jason Aguilera MD at [facility] ENDOSCOPY    HERNIA REPAIR      PROSTATE BIOPSY, NEEDLE, SATURATION SAMPLING  12/20/2022    UPPER GASTROINTESTINAL ENDOSCOPY N/A 1/12/2024    EGD BIOPSY performed by Jason Aguilera MD at [facility] ENDOSCOPY      Social History     Tobacco Use    Smoking status: Some Days     Current packs/day: 0.25     Average packs/day: 0.3 packs/day

## 2024-02-21 NOTE — PATIENT INSTRUCTIONS
Please have yoru labs repeated in 2 months.  We have referred you to Dr. Aguilera who performed your endoscopies in the hospital. He will decide with you about a Capsule Study.

## 2024-02-21 NOTE — PROGRESS NOTES
Chief Complaint   Patient presents with    New Patient           Vitals:    02/21/24 1444   BP: (!) 143/78   Pulse: 98   Resp: 18   Temp: 97.8 °F (36.6 °C)   SpO2: 98%            1. Have you been to the ER, urgent care clinic since your last visit?  Hospitalized since your last visit?  New patient   2. Have you seen or consulted any other health care providers outside of the [facility] since your last visit?  Include any pap smears or colon screening. New Patient

## 2024-02-22 LAB
BASOPHILS # BLD AUTO: 0 X10E3/UL (ref 0–0.2)
BASOPHILS NFR BLD AUTO: 1 %
ENDOMYSIUM IGA SER QL: NEGATIVE
EOSINOPHIL # BLD AUTO: 0.2 X10E3/UL (ref 0–0.4)
EOSINOPHIL NFR BLD AUTO: 4 %
ERYTHROCYTE [DISTWIDTH] IN BLOOD BY AUTOMATED COUNT: 24.2 % (ref 11.6–15.4)
HCT VFR BLD AUTO: 41.5 % (ref 37.5–51)
HGB BLD-MCNC: 13.1 G/DL (ref 13–17.7)
IGA SERPL-MCNC: 233 MG/DL (ref 61–437)
IMM GRANULOCYTES # BLD AUTO: 0 X10E3/UL (ref 0–0.1)
IMM GRANULOCYTES NFR BLD AUTO: 0 %
LYMPHOCYTES # BLD AUTO: 1.7 X10E3/UL (ref 0.7–3.1)
LYMPHOCYTES NFR BLD AUTO: 27 %
MCH RBC QN AUTO: 25.9 PG (ref 26.6–33)
MCHC RBC AUTO-ENTMCNC: 31.6 G/DL (ref 31.5–35.7)
MCV RBC AUTO: 82 FL (ref 79–97)
MONOCYTES # BLD AUTO: 0.6 X10E3/UL (ref 0.1–0.9)
MONOCYTES NFR BLD AUTO: 9 %
MORPHOLOGY BLD-IMP: ABNORMAL
NEUTROPHILS # BLD AUTO: 3.8 X10E3/UL (ref 1.4–7)
NEUTROPHILS NFR BLD AUTO: 59 %
PLATELET # BLD AUTO: 258 X10E3/UL (ref 150–450)
RBC # BLD AUTO: 5.06 X10E6/UL (ref 4.14–5.8)
TTG IGA SER-ACNC: <2 U/ML (ref 0–3)
WBC # BLD AUTO: 6.3 X10E3/UL (ref 3.4–10.8)

## 2024-02-23 PROBLEM — D50.0 IRON DEFICIENCY ANEMIA DUE TO CHRONIC BLOOD LOSS: Status: ACTIVE | Noted: 2024-02-23

## 2024-05-01 ENCOUNTER — OFFICE VISIT (OUTPATIENT)
Age: 69
End: 2024-05-01
Payer: MEDICARE

## 2024-05-01 VITALS
SYSTOLIC BLOOD PRESSURE: 127 MMHG | HEIGHT: 67 IN | RESPIRATION RATE: 16 BRPM | HEART RATE: 74 BPM | DIASTOLIC BLOOD PRESSURE: 74 MMHG | BODY MASS INDEX: 26.21 KG/M2 | WEIGHT: 167 LBS | OXYGEN SATURATION: 97 %

## 2024-05-01 DIAGNOSIS — N40.0 BENIGN PROSTATIC HYPERPLASIA WITHOUT LOWER URINARY TRACT SYMPTOMS: ICD-10-CM

## 2024-05-01 DIAGNOSIS — C61 PROSTATE CANCER (HCC): Primary | ICD-10-CM

## 2024-05-01 LAB
BILIRUBIN, URINE, POC: NEGATIVE
BLOOD URINE, POC: NEGATIVE
GLUCOSE URINE, POC: NEGATIVE
KETONES, URINE, POC: NEGATIVE
LEUKOCYTE ESTERASE, URINE, POC: NEGATIVE
NITRITE, URINE, POC: NEGATIVE
PH, URINE, POC: 6 (ref 4.6–8)
PROTEIN,URINE, POC: NEGATIVE
SPECIFIC GRAVITY, URINE, POC: 1.02 (ref 1–1.03)
URINALYSIS CLARITY, POC: CLEAR
URINALYSIS COLOR, POC: YELLOW
UROBILINOGEN, POC: NORMAL

## 2024-05-01 PROCEDURE — 1123F ACP DISCUSS/DSCN MKR DOCD: CPT | Performed by: UROLOGY

## 2024-05-01 PROCEDURE — 81001 URINALYSIS AUTO W/SCOPE: CPT | Performed by: UROLOGY

## 2024-05-01 PROCEDURE — G8427 DOCREV CUR MEDS BY ELIG CLIN: HCPCS | Performed by: UROLOGY

## 2024-05-01 PROCEDURE — 4004F PT TOBACCO SCREEN RCVD TLK: CPT | Performed by: UROLOGY

## 2024-05-01 PROCEDURE — G8419 CALC BMI OUT NRM PARAM NOF/U: HCPCS | Performed by: UROLOGY

## 2024-05-01 PROCEDURE — 3074F SYST BP LT 130 MM HG: CPT | Performed by: UROLOGY

## 2024-05-01 PROCEDURE — 99214 OFFICE O/P EST MOD 30 MIN: CPT | Performed by: UROLOGY

## 2024-05-01 PROCEDURE — 3017F COLORECTAL CA SCREEN DOC REV: CPT | Performed by: UROLOGY

## 2024-05-01 PROCEDURE — 3078F DIAST BP <80 MM HG: CPT | Performed by: UROLOGY

## 2024-05-01 NOTE — ASSESSMENT & PLAN NOTE
H/o Youngwood's 6 prostate cancer on biopsy 12/20/2022.  PSA has been trending down from a peak of 17.2 to 12.3 10/25/23.  He is due for a PSA now.  We discussed reimaging and biopsy in December.

## 2024-05-01 NOTE — PROGRESS NOTES
Chief Complaint   Patient presents with    Prostate Cancer     1. Have you been to the ER, urgent care clinic since your last visit?  Hospitalized since your last visit?No    2. Have you seen or consulted any other health care providers outside of the [facility] since your last visit?  Include any pap smears or colon screening. No  /74 (Site: Right Upper Arm, Position: Sitting, Cuff Size: Medium Adult)   Pulse 74   Resp 16   Ht 1.702 m (5' 7\")   Wt 75.8 kg (167 lb)   SpO2 97%   BMI 26.16 kg/m²

## 2024-05-01 NOTE — PROGRESS NOTES
HISTORY OF PRESENT ILLNESS  Miguelito Ascencio is a 69 y.o. male.   has a past medical history of Anemia, Bell's palsy, Borderline diabetes, GERD (gastroesophageal reflux disease), Hearing loss, Heart murmur, History of blood transfusion, Hypercholesterolemia, Hypertension, Prostate cancer (HCC), and Retina degeneration.  has a past surgical history that includes hernia repair; prostate biopsy, needle, saturation sampling (12/20/2022); Colonoscopy (N/A, 1/12/2024); Upper gastrointestinal endoscopy (N/A, 1/12/2024); and Colonoscopy (N/A, 1/12/2024).  Chief Complaint   Patient presents with    Prostate Cancer     The patient denies urinary frequency, urgency, dysuria or hematuria.    The patient denies a weak urinary stream, sense of incomplete emptying, straining or hesitancy.  UA negative.   History of prostate cancer, low risk.  PSA due now.                Component  Ref Range & Units 10/25/23 1502 7/19/23 1319 4/5/23 1422 9/21/22 1555 6/13/22 0911   PSA  0.0 - 4.0 ng/mL 12.3 High  14.7 High  CM 16.6 High  CM 17.2 High  CM 15.9 High  CM        1. Prostate cancer (HCC)  Overview:  PSA 15.9, status post MR fusion guided prostate biopsy 12/20/2022.  1 of 14 zones were positive for the Dasha's 3+3 disease and less than 5% of the tissue.    Assessment & Plan:  H/o Dasha's 6 prostate cancer on biopsy 12/20/2022.  PSA has been trending down from a peak of 17.2 to 12.3 10/25/23.  He is due for a PSA now.  We discussed reimaging and biopsy in December.   Orders:  -     PSA, Total and Free  2. Benign prostatic hyperplasia without lower urinary tract symptoms  Assessment & Plan:   Large prostate without LUTS. Benign exam. Not on medication  Orders:  -     AMB POC URINALYSIS DIP STICK AUTO W/ MICRO      No Known Allergies   Prior to Admission medications    Medication Sig Start Date End Date Taking? Authorizing Provider   VITAMIN D PO Take by mouth   Yes Provider, Randy, MD   Multiple Vitamin (MULTIVITAMIN PO) Take by

## 2024-05-02 LAB
PSA FREE MFR SERPL: 21.3 %
PSA FREE SERPL-MCNC: 3.11 NG/ML
PSA SERPL-MCNC: 14.6 NG/ML (ref 0–4)

## 2024-07-26 ENCOUNTER — OFFICE VISIT (OUTPATIENT)
Dept: PRIMARY CARE CLINIC | Facility: CLINIC | Age: 69
End: 2024-07-26
Payer: MEDICARE

## 2024-07-26 VITALS
WEIGHT: 173.4 LBS | SYSTOLIC BLOOD PRESSURE: 160 MMHG | HEART RATE: 85 BPM | DIASTOLIC BLOOD PRESSURE: 93 MMHG | BODY MASS INDEX: 27.21 KG/M2 | RESPIRATION RATE: 16 BRPM | HEIGHT: 67 IN | OXYGEN SATURATION: 98 % | TEMPERATURE: 97.8 F

## 2024-07-26 DIAGNOSIS — E78.5 HYPERLIPIDEMIA, UNSPECIFIED HYPERLIPIDEMIA TYPE: ICD-10-CM

## 2024-07-26 DIAGNOSIS — D50.9 MICROCYTIC ANEMIA: Primary | ICD-10-CM

## 2024-07-26 DIAGNOSIS — I10 PRIMARY HYPERTENSION: ICD-10-CM

## 2024-07-26 PROCEDURE — 4004F PT TOBACCO SCREEN RCVD TLK: CPT | Performed by: FAMILY MEDICINE

## 2024-07-26 PROCEDURE — G8419 CALC BMI OUT NRM PARAM NOF/U: HCPCS | Performed by: FAMILY MEDICINE

## 2024-07-26 PROCEDURE — 3077F SYST BP >= 140 MM HG: CPT | Performed by: FAMILY MEDICINE

## 2024-07-26 PROCEDURE — 3017F COLORECTAL CA SCREEN DOC REV: CPT | Performed by: FAMILY MEDICINE

## 2024-07-26 PROCEDURE — G8427 DOCREV CUR MEDS BY ELIG CLIN: HCPCS | Performed by: FAMILY MEDICINE

## 2024-07-26 PROCEDURE — 1123F ACP DISCUSS/DSCN MKR DOCD: CPT | Performed by: FAMILY MEDICINE

## 2024-07-26 PROCEDURE — 99214 OFFICE O/P EST MOD 30 MIN: CPT | Performed by: FAMILY MEDICINE

## 2024-07-26 PROCEDURE — 3080F DIAST BP >= 90 MM HG: CPT | Performed by: FAMILY MEDICINE

## 2024-07-26 RX ORDER — FERROUS SULFATE 325(65) MG
325 TABLET ORAL
Qty: 90 TABLET | Refills: 3 | Status: SHIPPED | OUTPATIENT
Start: 2024-07-26

## 2024-07-26 RX ORDER — ATORVASTATIN CALCIUM 40 MG/1
40 TABLET, FILM COATED ORAL DAILY
Qty: 90 TABLET | Refills: 3 | Status: SHIPPED | OUTPATIENT
Start: 2024-07-26

## 2024-07-26 RX ORDER — LOSARTAN POTASSIUM 25 MG/1
25 TABLET ORAL DAILY
Qty: 90 TABLET | Refills: 3 | Status: SHIPPED | OUTPATIENT
Start: 2024-07-26

## 2024-07-26 NOTE — ASSESSMENT & PLAN NOTE
Secondary to GI bleed. S/p colonoscopy and iron therapy. Improved at last check. If normal on labs, will stop iron.

## 2024-07-26 NOTE — PROGRESS NOTES
Subjective  Chief Complaint   Patient presents with    Medication Refill     HPI:  Miguelito Ascencio is a 69 y.o. male.    Patient presents today for regular f/u he is about out of his losartan take it this morning.  He also had a very salty meal.  He does not have any complaints other than being on medications.  He does report that he has a very sedentary lifestyle and does not eat very well.      Past Medical History:   Diagnosis Date    Anemia     Cabral's palsy     \"as a baby\"    Borderline diabetes     GERD (gastroesophageal reflux disease)     Hearing loss     right    Heart murmur     History of blood transfusion     no reaction    Hypercholesterolemia     Hypertension     Prostate cancer (HCC)     Retina degeneration     pt states \"can't see anymore, retinas got ate up by parasites somehow\"     Current Outpatient Medications on File Prior to Visit   Medication Sig Dispense Refill    VITAMIN D PO Take by mouth      Multiple Vitamin (MULTIVITAMIN PO) Take by mouth      omeprazole (PRILOSEC) 20 MG delayed release capsule Take 1 capsule by mouth daily       No current facility-administered medications on file prior to visit.     No Known Allergies    Objective  Vitals:    07/26/24 1257   BP: (!) 160/93   Pulse: 85   Resp: 16   Temp: 97.8 °F (36.6 °C)   SpO2: 98%     Wt Readings from Last 3 Encounters:   07/26/24 78.7 kg (173 lb 6.4 oz)   05/01/24 75.8 kg (167 lb)   02/21/24 75.8 kg (167 lb)     Physical Exam  Constitutional:       Appearance: Normal appearance.   HENT:      Head: Normocephalic and atraumatic.   Cardiovascular:      Rate and Rhythm: Normal rate and regular rhythm.   Pulmonary:      Effort: Pulmonary effort is normal.      Breath sounds: Normal breath sounds.   Neurological:      General: No focal deficit present.      Mental Status: He is alert and oriented to person, place, and time.   Psychiatric:         Mood and Affect: Mood normal.         Behavior: Behavior normal.          Assessment &

## 2024-07-26 NOTE — PROGRESS NOTES
\"Have you been to the ER, urgent care clinic since your last visit?  Hospitalized since your last visit?\"    NO    “Have you seen or consulted any other health care providers outside of [facility] since your last visit?”    NO

## 2024-09-05 ENCOUNTER — TELEPHONE (OUTPATIENT)
Dept: PRIMARY CARE CLINIC | Facility: CLINIC | Age: 69
End: 2024-09-05

## 2024-09-05 NOTE — TELEPHONE ENCOUNTER
Called pt in ref to BP medication, pt states that he has been having problems taking medication at 10 am due to not sleeping well and sleeping past 10 am. Pt has been informed that he can take medication when he remembers to take the BP medication. Pt has been informed that best to take Cholesterol medication at bed time.  Pt voiced understanding.

## 2024-09-05 NOTE — TELEPHONE ENCOUNTER
Pt ([phone]) would like to speak with someone regarding changing the time that he takes his bp medication.    Please assist with this matter.     VLT - PSR (Float Pool)

## 2024-10-18 ENCOUNTER — OFFICE VISIT (OUTPATIENT)
Dept: PRIMARY CARE CLINIC | Facility: CLINIC | Age: 69
End: 2024-10-18

## 2024-10-18 VITALS
HEIGHT: 67 IN | HEART RATE: 76 BPM | RESPIRATION RATE: 16 BRPM | DIASTOLIC BLOOD PRESSURE: 94 MMHG | WEIGHT: 173.8 LBS | BODY MASS INDEX: 27.28 KG/M2 | SYSTOLIC BLOOD PRESSURE: 148 MMHG | TEMPERATURE: 97.9 F

## 2024-10-18 DIAGNOSIS — H91.91 HEARING LOSS OF RIGHT EAR, UNSPECIFIED HEARING LOSS TYPE: ICD-10-CM

## 2024-10-18 DIAGNOSIS — E78.5 HYPERLIPIDEMIA, UNSPECIFIED HYPERLIPIDEMIA TYPE: ICD-10-CM

## 2024-10-18 DIAGNOSIS — S89.92XS INJURY OF LEFT KNEE, SEQUELA: ICD-10-CM

## 2024-10-18 DIAGNOSIS — H35.9 RETINA DEGENERATION: ICD-10-CM

## 2024-10-18 DIAGNOSIS — Z00.00 MEDICARE ANNUAL WELLNESS VISIT, SUBSEQUENT: Primary | ICD-10-CM

## 2024-10-18 DIAGNOSIS — H54.7 POOR VISION: ICD-10-CM

## 2024-10-18 DIAGNOSIS — R73.03 PREDIABETES: ICD-10-CM

## 2024-10-18 DIAGNOSIS — D50.9 IRON DEFICIENCY ANEMIA, UNSPECIFIED IRON DEFICIENCY ANEMIA TYPE: ICD-10-CM

## 2024-10-18 SDOH — ECONOMIC STABILITY: INCOME INSECURITY: HOW HARD IS IT FOR YOU TO PAY FOR THE VERY BASICS LIKE FOOD, HOUSING, MEDICAL CARE, AND HEATING?: PATIENT DECLINED

## 2024-10-18 SDOH — ECONOMIC STABILITY: FOOD INSECURITY: WITHIN THE PAST 12 MONTHS, YOU WORRIED THAT YOUR FOOD WOULD RUN OUT BEFORE YOU GOT MONEY TO BUY MORE.: PATIENT DECLINED

## 2024-10-18 SDOH — ECONOMIC STABILITY: FOOD INSECURITY: WITHIN THE PAST 12 MONTHS, THE FOOD YOU BOUGHT JUST DIDN'T LAST AND YOU DIDN'T HAVE MONEY TO GET MORE.: PATIENT DECLINED

## 2024-10-18 ASSESSMENT — PATIENT HEALTH QUESTIONNAIRE - PHQ9
SUM OF ALL RESPONSES TO PHQ QUESTIONS 1-9: 0
SUM OF ALL RESPONSES TO PHQ QUESTIONS 1-9: 0
2. FEELING DOWN, DEPRESSED OR HOPELESS: NOT AT ALL
SUM OF ALL RESPONSES TO PHQ QUESTIONS 1-9: 0
1. LITTLE INTEREST OR PLEASURE IN DOING THINGS: NOT AT ALL
SUM OF ALL RESPONSES TO PHQ9 QUESTIONS 1 & 2: 0
SUM OF ALL RESPONSES TO PHQ QUESTIONS 1-9: 0

## 2024-10-18 ASSESSMENT — LIFESTYLE VARIABLES
HOW OFTEN DO YOU HAVE A DRINK CONTAINING ALCOHOL: 2-4 TIMES A MONTH
HOW MANY STANDARD DRINKS CONTAINING ALCOHOL DO YOU HAVE ON A TYPICAL DAY: 3 OR 4

## 2024-10-18 NOTE — PATIENT INSTRUCTIONS
of this information.           Learning About Vision Tests  What are vision tests?     The four most common vision tests are visual acuity tests, refraction, visual field tests, and color vision tests.  Visual acuity (sharpness) tests  These tests are used:  To see if you need glasses or contact lenses.  To monitor an eye problem.  To check an eye injury.  Visual acuity tests are done as part of routine exams. You may also have this test when you get your 's license or apply for some types of jobs.  Visual field tests  These tests are used:  To check for vision loss in any area of your range of vision.  To screen for certain eye diseases.  To look for nerve damage after a stroke, head injury, or other problem that could reduce blood flow to the brain.  Refraction and color tests  A refraction test is done to find the right prescription for glasses and contact lenses.  A color vision test is done to check for color blindness.  Color vision is often tested as part of a routine exam. You may also have this test when you apply for a job where recognizing different colors is important, such as , electronics, or the .  How are vision tests done?  Visual acuity test   You cover one eye at a time.  You read aloud from a wall chart across the room.  You read aloud from a small card that you hold in your hand.  Refraction   You look into a special device.  The device puts lenses of different strengths in front of each eye to see how strong your glasses or contact lenses need to be.  Visual field tests   Your doctor may have you look through special machines.  Or your doctor may simply have you stare straight ahead while they move a finger into and out of your field of vision.  Color vision test   You look at pieces of printed test patterns in various colors. You say what number or symbol you see.  Your doctor may have you trace the number or symbol using a pointer.  How do these tests feel?  There

## 2024-10-19 PROBLEM — S89.92XA INJURY OF LEFT KNEE: Status: ACTIVE | Noted: 2024-10-19

## 2024-10-19 LAB
ALBUMIN SERPL-MCNC: 4.4 G/DL (ref 3.9–4.9)
ALP SERPL-CCNC: 108 IU/L (ref 44–121)
ALT SERPL-CCNC: 22 IU/L (ref 0–44)
AST SERPL-CCNC: 24 IU/L (ref 0–40)
BASOPHILS # BLD AUTO: 0 X10E3/UL (ref 0–0.2)
BASOPHILS NFR BLD AUTO: 1 %
BILIRUB SERPL-MCNC: 0.5 MG/DL (ref 0–1.2)
BUN SERPL-MCNC: 12 MG/DL (ref 8–27)
BUN/CREAT SERPL: 13 (ref 10–24)
CALCIUM SERPL-MCNC: 9.4 MG/DL (ref 8.6–10.2)
CHLORIDE SERPL-SCNC: 105 MMOL/L (ref 96–106)
CHOLEST SERPL-MCNC: 157 MG/DL (ref 100–199)
CO2 SERPL-SCNC: 20 MMOL/L (ref 20–29)
CREAT SERPL-MCNC: 0.89 MG/DL (ref 0.76–1.27)
EGFRCR SERPLBLD CKD-EPI 2021: 93 ML/MIN/1.73
EOSINOPHIL # BLD AUTO: 0.2 X10E3/UL (ref 0–0.4)
EOSINOPHIL NFR BLD AUTO: 3 %
ERYTHROCYTE [DISTWIDTH] IN BLOOD BY AUTOMATED COUNT: 12.3 % (ref 11.6–15.4)
FERRITIN SERPL-MCNC: 61 NG/ML (ref 30–400)
GLOBULIN SER CALC-MCNC: 2.8 G/DL (ref 1.5–4.5)
GLUCOSE SERPL-MCNC: 97 MG/DL (ref 70–99)
HBA1C MFR BLD: 5.7 % (ref 4.8–5.6)
HCT VFR BLD AUTO: 45.1 % (ref 37.5–51)
HDLC SERPL-MCNC: 49 MG/DL
HGB BLD-MCNC: 15.5 G/DL (ref 13–17.7)
IMM GRANULOCYTES # BLD AUTO: 0 X10E3/UL (ref 0–0.1)
IMM GRANULOCYTES NFR BLD AUTO: 0 %
IRON SATN MFR SERPL: 39 % (ref 15–55)
IRON SERPL-MCNC: 127 UG/DL (ref 38–169)
LDLC SERPL CALC-MCNC: 87 MG/DL (ref 0–99)
LYMPHOCYTES # BLD AUTO: 1.8 X10E3/UL (ref 0.7–3.1)
LYMPHOCYTES NFR BLD AUTO: 23 %
MCH RBC QN AUTO: 31.6 PG (ref 26.6–33)
MCHC RBC AUTO-ENTMCNC: 34.4 G/DL (ref 31.5–35.7)
MCV RBC AUTO: 92 FL (ref 79–97)
MONOCYTES # BLD AUTO: 0.7 X10E3/UL (ref 0.1–0.9)
MONOCYTES NFR BLD AUTO: 9 %
NEUTROPHILS # BLD AUTO: 4.9 X10E3/UL (ref 1.4–7)
NEUTROPHILS NFR BLD AUTO: 64 %
PLATELET # BLD AUTO: 230 X10E3/UL (ref 150–450)
POTASSIUM SERPL-SCNC: 4.3 MMOL/L (ref 3.5–5.2)
PROT SERPL-MCNC: 7.2 G/DL (ref 6–8.5)
RBC # BLD AUTO: 4.9 X10E6/UL (ref 4.14–5.8)
SODIUM SERPL-SCNC: 140 MMOL/L (ref 134–144)
TIBC SERPL-MCNC: 329 UG/DL (ref 250–450)
TRIGL SERPL-MCNC: 117 MG/DL (ref 0–149)
UIBC SERPL-MCNC: 202 UG/DL (ref 111–343)
VLDLC SERPL CALC-MCNC: 21 MG/DL (ref 5–40)
WBC # BLD AUTO: 7.6 X10E3/UL (ref 3.4–10.8)

## 2024-11-08 ENCOUNTER — TELEPHONE (OUTPATIENT)
Dept: PRIMARY CARE CLINIC | Facility: CLINIC | Age: 69
End: 2024-11-08

## 2024-11-08 NOTE — TELEPHONE ENCOUNTER
----- Message from Kianna PEREZ sent at 11/8/2024  4:35 PM EST -----  Regarding: [facility] Referral Request  [facility] Referral Request    Reason for referral request: Specialty Provider: ENT Specialist    Specialist/Lab/Test patient is requesting (if known):    Specialist Phone Number (if applicable):    Additional Information: Patient needs a referral for an ear doctor by the PCP Jason Haynes MD  --------------------------------------------------------------------------------------------------------------------------    Relationship to Patient: Self     Call Back Information: OK to leave message on voicemail   Preferred Call Back Number: Phone [phone]

## 2024-12-26 ENCOUNTER — OFFICE VISIT (OUTPATIENT)
Dept: PRIMARY CARE CLINIC | Facility: CLINIC | Age: 69
End: 2024-12-26
Payer: MEDICARE

## 2024-12-26 VITALS
HEIGHT: 67 IN | HEART RATE: 74 BPM | TEMPERATURE: 98.1 F | SYSTOLIC BLOOD PRESSURE: 119 MMHG | DIASTOLIC BLOOD PRESSURE: 70 MMHG | RESPIRATION RATE: 16 BRPM | WEIGHT: 173 LBS | BODY MASS INDEX: 27.15 KG/M2 | OXYGEN SATURATION: 98 %

## 2024-12-26 DIAGNOSIS — L82.1 SK (SEBORRHEIC KERATOSIS): ICD-10-CM

## 2024-12-26 DIAGNOSIS — H61.20 IMPACTED CERUMEN, UNSPECIFIED LATERALITY: ICD-10-CM

## 2024-12-26 DIAGNOSIS — C61 PROSTATE CANCER (HCC): Primary | ICD-10-CM

## 2024-12-26 PROCEDURE — 3074F SYST BP LT 130 MM HG: CPT | Performed by: FAMILY MEDICINE

## 2024-12-26 PROCEDURE — 1160F RVW MEDS BY RX/DR IN RCRD: CPT | Performed by: FAMILY MEDICINE

## 2024-12-26 PROCEDURE — 4004F PT TOBACCO SCREEN RCVD TLK: CPT | Performed by: FAMILY MEDICINE

## 2024-12-26 PROCEDURE — 1123F ACP DISCUSS/DSCN MKR DOCD: CPT | Performed by: FAMILY MEDICINE

## 2024-12-26 PROCEDURE — G8484 FLU IMMUNIZE NO ADMIN: HCPCS | Performed by: FAMILY MEDICINE

## 2024-12-26 PROCEDURE — G8427 DOCREV CUR MEDS BY ELIG CLIN: HCPCS | Performed by: FAMILY MEDICINE

## 2024-12-26 PROCEDURE — 3017F COLORECTAL CA SCREEN DOC REV: CPT | Performed by: FAMILY MEDICINE

## 2024-12-26 PROCEDURE — 99212 OFFICE O/P EST SF 10 MIN: CPT | Performed by: FAMILY MEDICINE

## 2024-12-26 PROCEDURE — G8419 CALC BMI OUT NRM PARAM NOF/U: HCPCS | Performed by: FAMILY MEDICINE

## 2024-12-26 PROCEDURE — 1126F AMNT PAIN NOTED NONE PRSNT: CPT | Performed by: FAMILY MEDICINE

## 2024-12-26 PROCEDURE — 3078F DIAST BP <80 MM HG: CPT | Performed by: FAMILY MEDICINE

## 2024-12-26 PROCEDURE — 1159F MED LIST DOCD IN RCRD: CPT | Performed by: FAMILY MEDICINE

## 2024-12-26 SDOH — ECONOMIC STABILITY: FOOD INSECURITY: WITHIN THE PAST 12 MONTHS, THE FOOD YOU BOUGHT JUST DIDN'T LAST AND YOU DIDN'T HAVE MONEY TO GET MORE.: NEVER TRUE

## 2024-12-26 SDOH — ECONOMIC STABILITY: FOOD INSECURITY: WITHIN THE PAST 12 MONTHS, YOU WORRIED THAT YOUR FOOD WOULD RUN OUT BEFORE YOU GOT MONEY TO BUY MORE.: NEVER TRUE

## 2024-12-26 SDOH — ECONOMIC STABILITY: INCOME INSECURITY: HOW HARD IS IT FOR YOU TO PAY FOR THE VERY BASICS LIKE FOOD, HOUSING, MEDICAL CARE, AND HEATING?: NOT HARD AT ALL

## 2024-12-26 ASSESSMENT — PATIENT HEALTH QUESTIONNAIRE - PHQ9
2. FEELING DOWN, DEPRESSED OR HOPELESS: NOT AT ALL
SUM OF ALL RESPONSES TO PHQ9 QUESTIONS 1 & 2: 0
SUM OF ALL RESPONSES TO PHQ QUESTIONS 1-9: 0
1. LITTLE INTEREST OR PLEASURE IN DOING THINGS: NOT AT ALL
SUM OF ALL RESPONSES TO PHQ QUESTIONS 1-9: 0

## 2024-12-26 ASSESSMENT — LIFESTYLE VARIABLES: HOW OFTEN DO YOU HAVE A DRINK CONTAINING ALCOHOL: 2-4 TIMES A MONTH

## 2024-12-26 NOTE — PROGRESS NOTES
Chief Complaint   Patient presents with    Medicare AWV     /70 (Site: Left Upper Arm, Position: Sitting)   Pulse 74   Temp 98.1 °F (36.7 °C) (Oral)   Resp 16   Ht 1.702 m (5' 7\")   Wt 78.5 kg (173 lb)   SpO2 98%   BMI 27.10 kg/m²     \"Have you been to the ER, urgent care clinic since your last visit?  Hospitalized since your last visit?\"    NO    “Have you seen or consulted any other health care providers outside our system since your last visit?”    NO

## 2024-12-27 NOTE — PROGRESS NOTES
Subjective  Chief Complaint   Patient presents with    Medicare AWV     HPI:  Miguelito Ascencio is a 69 y.o. male.    Patient presents for regular office visit today.    Prostate cancer-followed by urology.  Recommended to possibly have another biopsy, the patient is hesitant to go through them.    Seborrheic keratosis-patient concerned about small dark bumps on his forearms.    Impacted cerumen/reduced hearing-patient would like evaluation by ENT for chronic issues with hearing and cerumen buildup    Past Medical History:   Diagnosis Date    Anemia     Cabral's palsy     \"as a baby\"    Borderline diabetes     GERD (gastroesophageal reflux disease)     Hearing loss     right    Heart murmur     History of blood transfusion     no reaction    Hypercholesterolemia     Hypertension     Prostate cancer (HCC)     Retina degeneration     pt states \"can't see anymore, retinas got ate up by parasites somehow\"     Current Outpatient Medications on File Prior to Visit   Medication Sig Dispense Refill    atorvastatin (LIPITOR) 40 MG tablet Take 1 tablet by mouth daily 90 tablet 3    ferrous sulfate (IRON 325) 325 (65 Fe) MG tablet Take 1 tablet by mouth daily (with breakfast) 90 tablet 3    losartan (COZAAR) 25 MG tablet Take 1 tablet by mouth daily 90 tablet 3    VITAMIN D PO Take by mouth      Multiple Vitamin (MULTIVITAMIN PO) Take by mouth      omeprazole (PRILOSEC) 20 MG delayed release capsule Take 1 capsule by mouth daily       No current facility-administered medications on file prior to visit.     No Known Allergies    Objective  Vitals:    12/26/24 1022   BP: 119/70   Pulse: 74   Resp: 16   Temp: 98.1 °F (36.7 °C)   SpO2: 98%     Wt Readings from Last 3 Encounters:   12/26/24 78.5 kg (173 lb)   10/18/24 78.8 kg (173 lb 12.8 oz)   07/26/24 78.7 kg (173 lb 6.4 oz)     Physical Exam  Constitutional:       Appearance: Normal appearance.   HENT:      Head: Normocephalic and atraumatic.   Skin:            Comments: Scattered 2

## 2025-01-02 ENCOUNTER — PROCEDURE VISIT (OUTPATIENT)
Age: 70
End: 2025-01-02

## 2025-01-02 ENCOUNTER — OFFICE VISIT (OUTPATIENT)
Age: 70
End: 2025-01-02
Payer: MEDICARE

## 2025-01-02 VITALS
SYSTOLIC BLOOD PRESSURE: 122 MMHG | OXYGEN SATURATION: 96 % | DIASTOLIC BLOOD PRESSURE: 74 MMHG | HEIGHT: 67 IN | WEIGHT: 173 LBS | BODY MASS INDEX: 27.15 KG/M2 | HEART RATE: 66 BPM

## 2025-01-02 DIAGNOSIS — H93.13 TINNITUS, BILATERAL: ICD-10-CM

## 2025-01-02 DIAGNOSIS — H90.6 MIXED CONDUCTIVE AND SENSORINEURAL HEARING LOSS OF BOTH EARS: Primary | ICD-10-CM

## 2025-01-02 DIAGNOSIS — H65.91 FLUID LEVEL BEHIND TYMPANIC MEMBRANE OF RIGHT EAR: ICD-10-CM

## 2025-01-02 DIAGNOSIS — H90.A22 SENSORINEURAL HEARING LOSS (SNHL) OF LEFT EAR WITH RESTRICTED HEARING OF RIGHT EAR: Primary | ICD-10-CM

## 2025-01-02 DIAGNOSIS — H90.A31 MIXED CONDUCTIVE AND SENSORINEURAL HEARING LOSS OF RIGHT EAR WITH RESTRICTED HEARING OF LEFT EAR: ICD-10-CM

## 2025-01-02 DIAGNOSIS — R42 DIZZINESS AND GIDDINESS: ICD-10-CM

## 2025-01-02 PROCEDURE — 99214 OFFICE O/P EST MOD 30 MIN: CPT | Performed by: STUDENT IN AN ORGANIZED HEALTH CARE EDUCATION/TRAINING PROGRAM

## 2025-01-02 PROCEDURE — 4004F PT TOBACCO SCREEN RCVD TLK: CPT | Performed by: STUDENT IN AN ORGANIZED HEALTH CARE EDUCATION/TRAINING PROGRAM

## 2025-01-02 PROCEDURE — 1159F MED LIST DOCD IN RCRD: CPT | Performed by: STUDENT IN AN ORGANIZED HEALTH CARE EDUCATION/TRAINING PROGRAM

## 2025-01-02 PROCEDURE — G8427 DOCREV CUR MEDS BY ELIG CLIN: HCPCS | Performed by: STUDENT IN AN ORGANIZED HEALTH CARE EDUCATION/TRAINING PROGRAM

## 2025-01-02 PROCEDURE — 92504 EAR MICROSCOPY EXAMINATION: CPT | Performed by: STUDENT IN AN ORGANIZED HEALTH CARE EDUCATION/TRAINING PROGRAM

## 2025-01-02 PROCEDURE — 3078F DIAST BP <80 MM HG: CPT | Performed by: STUDENT IN AN ORGANIZED HEALTH CARE EDUCATION/TRAINING PROGRAM

## 2025-01-02 PROCEDURE — M1300 PR FLU IMM NO ADMIN DOC REA: HCPCS | Performed by: STUDENT IN AN ORGANIZED HEALTH CARE EDUCATION/TRAINING PROGRAM

## 2025-01-02 PROCEDURE — 3074F SYST BP LT 130 MM HG: CPT | Performed by: STUDENT IN AN ORGANIZED HEALTH CARE EDUCATION/TRAINING PROGRAM

## 2025-01-02 PROCEDURE — G8419 CALC BMI OUT NRM PARAM NOF/U: HCPCS | Performed by: STUDENT IN AN ORGANIZED HEALTH CARE EDUCATION/TRAINING PROGRAM

## 2025-01-02 PROCEDURE — 3017F COLORECTAL CA SCREEN DOC REV: CPT | Performed by: STUDENT IN AN ORGANIZED HEALTH CARE EDUCATION/TRAINING PROGRAM

## 2025-01-02 PROCEDURE — 1123F ACP DISCUSS/DSCN MKR DOCD: CPT | Performed by: STUDENT IN AN ORGANIZED HEALTH CARE EDUCATION/TRAINING PROGRAM

## 2025-01-02 PROCEDURE — 1126F AMNT PAIN NOTED NONE PRSNT: CPT | Performed by: STUDENT IN AN ORGANIZED HEALTH CARE EDUCATION/TRAINING PROGRAM

## 2025-01-02 NOTE — PROGRESS NOTES
Subjective:   Miguelito Ascencio   69 y.o.   1955     Refered by: Murphy Prescott Md  [address] [facility] [place] [address]     New Patient Visit  Chief Compliant: middle ear effusion    History of Present Illness:  Miguelito Ascencio is a 69 y.o. male with past medical history of GERD, HLD, HTN, who presents today for evaluation of CODI and ETD.      Patient states over the last couple weeks he has been having clogging in his right ear as well as intermittent dizziness.  He states that the right ear will feel full and he states that it is impacted with wax.  He states he has tried many different methods to clean the ear however still having issues with hearing out of the ear.  He states that his left ear has been doing well.  He states that is not giving him any issues.  He denies any pain or drainage.     10/30/2023- Mr Ascencio returns today for follow up after being seen on 10/20/2023, with complaints of ear fullness and dizziness.  Dizziness is better but not completely resolved.  Patient continues to report mild dizziness as well as full sensation in both ears.  Denies any discharge.  Still endorses     11/17/23:   Status post 2 courses of steroids with persistent right middle ear effusion, effusion has been there for about approximately a month and a half now.    Patient's vertigo is persistent.  Describes vertigo as episodes of lightheadedness or instability lasting approximately 15 minutes.  Typically happens after standing up from a seated position or getting up from a recumbent position.  Can occasionally happen when leaning down.  Has noted that it started after starting his blood pressure medications.    Audiogram: Bilateral mild sloping to severe mixed hearing loss.  Bilateral flat type B temps.    1/2/2025:   Patient is once again complaining of hearing loss, particularly in the left ear.  Was last seen over 1 year ago.  Audiogram at that time showed bilateral mixed hearing loss with signs of middle

## 2025-01-02 NOTE — PROGRESS NOTES
Reliability: Good   Transducer: Inserts    See scanned audiogram dated 1/2/2025 for results.        PATIENT EDUCATION:       The following items were discussed with the patient:   - Good Communication Strategies  - Hearing Loss and Hearing Aids  - Noise-Induced Hearing Loss and use of Hearing Protection Devices (HPDs)     Educational information was shared in the After Visit Summary.                                                RECOMMENDATIONS:                                                                                                                                                                                                                                                            The following items are recommended based on patient report and results from today's appointment:   - Continue medical follow-up with Mary Anne Yost MD.    - Retest hearing as medically indicated and/or sooner if a change in hearing is noted.  - If desired, schedule a Hearing Aid Evaluation (HAE) appointment to discuss hearing aid options.  - Utilize \"Good Communication Strategies\" as discussed to assist in speech understanding with communication partners.  - Use hearing protection devices (HPDs), such as protective ear muffs and ear plugs, when exposed to dangerous sound levels.    Li Mcfadden, CCC-A  Audiologist     Chart CC'd to: Mary Anne Yost MD        Degree of   Hearing Sensitivity dB Range   Within Normal Limits (WNL) 0 - 25   Mild 25 - 40   Moderate 40 - 55   Moderately-Severe 55 - 70   Severe 70 - 90   Profound 90 +

## 2025-01-29 ENCOUNTER — PROCEDURE VISIT (OUTPATIENT)
Age: 70
End: 2025-01-29
Payer: MEDICARE

## 2025-01-29 VITALS
HEART RATE: 78 BPM | OXYGEN SATURATION: 95 % | DIASTOLIC BLOOD PRESSURE: 76 MMHG | HEIGHT: 67 IN | WEIGHT: 167.6 LBS | RESPIRATION RATE: 16 BRPM | SYSTOLIC BLOOD PRESSURE: 122 MMHG | BODY MASS INDEX: 26.3 KG/M2

## 2025-01-29 DIAGNOSIS — H90.6 MIXED CONDUCTIVE AND SENSORINEURAL HEARING LOSS OF BOTH EARS: Primary | ICD-10-CM

## 2025-01-29 DIAGNOSIS — H65.91 FLUID LEVEL BEHIND TYMPANIC MEMBRANE OF RIGHT EAR: ICD-10-CM

## 2025-01-29 PROCEDURE — 69433 CREATE EARDRUM OPENING: CPT | Performed by: STUDENT IN AN ORGANIZED HEALTH CARE EDUCATION/TRAINING PROGRAM

## 2025-01-29 RX ORDER — OFLOXACIN 3 MG/ML
5 SOLUTION AURICULAR (OTIC) 2 TIMES DAILY
Qty: 10 ML | Refills: 0 | Status: SHIPPED | OUTPATIENT
Start: 2025-01-29 | End: 2025-02-03

## 2025-01-29 NOTE — PROGRESS NOTES
PROCEDURE: right MYRINGOTOMY WITH TYMPANOSTOMY TUBE PLACEMENT (76808)     Preoperative diagnosis: Right middle ear effusion with conductive hearing loss  Postoperative diagnosis: Same    Risks, benefits and alternatives were reviewed including but not limited to risk of bleeding, infection, TM perforation, hearing loss, no improvement in hearing, otorrhea, retention of tube. Informed consent was obtained and the patient agreed to the procedure.  Under microscopy, the right ear was inspected with a speculum.  Any obstructive cerumen or debris was removed.  The inferior tympanic membrane was anesthetized with phenol.  A radial myringotomy was performed in the inferior quadrant and a serous effusion was evacuated from the myringotomy site. An alligator forceps followed by a Hough pick was then used to position a ventilation tube within the myringotomy.  Ciprodex drops were then instilled.  The patient tolerated the procedure well.

## 2025-03-20 ENCOUNTER — OFFICE VISIT (OUTPATIENT)
Age: 70
End: 2025-03-20
Payer: MEDICARE

## 2025-03-20 VITALS
OXYGEN SATURATION: 99 % | BODY MASS INDEX: 26.21 KG/M2 | HEIGHT: 67 IN | SYSTOLIC BLOOD PRESSURE: 140 MMHG | DIASTOLIC BLOOD PRESSURE: 79 MMHG | WEIGHT: 167 LBS | HEART RATE: 66 BPM

## 2025-03-20 DIAGNOSIS — Z96.22 S/P TUBE MYRINGOTOMY: Primary | ICD-10-CM

## 2025-03-20 PROCEDURE — 99213 OFFICE O/P EST LOW 20 MIN: CPT | Performed by: STUDENT IN AN ORGANIZED HEALTH CARE EDUCATION/TRAINING PROGRAM

## 2025-03-20 PROCEDURE — 3017F COLORECTAL CA SCREEN DOC REV: CPT | Performed by: STUDENT IN AN ORGANIZED HEALTH CARE EDUCATION/TRAINING PROGRAM

## 2025-03-20 PROCEDURE — G8419 CALC BMI OUT NRM PARAM NOF/U: HCPCS | Performed by: STUDENT IN AN ORGANIZED HEALTH CARE EDUCATION/TRAINING PROGRAM

## 2025-03-20 PROCEDURE — G8427 DOCREV CUR MEDS BY ELIG CLIN: HCPCS | Performed by: STUDENT IN AN ORGANIZED HEALTH CARE EDUCATION/TRAINING PROGRAM

## 2025-03-20 PROCEDURE — 4004F PT TOBACCO SCREEN RCVD TLK: CPT | Performed by: STUDENT IN AN ORGANIZED HEALTH CARE EDUCATION/TRAINING PROGRAM

## 2025-03-20 PROCEDURE — 1123F ACP DISCUSS/DSCN MKR DOCD: CPT | Performed by: STUDENT IN AN ORGANIZED HEALTH CARE EDUCATION/TRAINING PROGRAM

## 2025-03-20 PROCEDURE — 3078F DIAST BP <80 MM HG: CPT | Performed by: STUDENT IN AN ORGANIZED HEALTH CARE EDUCATION/TRAINING PROGRAM

## 2025-03-20 PROCEDURE — 1126F AMNT PAIN NOTED NONE PRSNT: CPT | Performed by: STUDENT IN AN ORGANIZED HEALTH CARE EDUCATION/TRAINING PROGRAM

## 2025-03-20 PROCEDURE — 1159F MED LIST DOCD IN RCRD: CPT | Performed by: STUDENT IN AN ORGANIZED HEALTH CARE EDUCATION/TRAINING PROGRAM

## 2025-03-20 PROCEDURE — 3077F SYST BP >= 140 MM HG: CPT | Performed by: STUDENT IN AN ORGANIZED HEALTH CARE EDUCATION/TRAINING PROGRAM

## 2025-03-20 PROCEDURE — 92504 EAR MICROSCOPY EXAMINATION: CPT | Performed by: STUDENT IN AN ORGANIZED HEALTH CARE EDUCATION/TRAINING PROGRAM

## 2025-03-20 RX ORDER — OFLOXACIN 3 MG/ML
5 SOLUTION AURICULAR (OTIC) 2 TIMES DAILY
Qty: 10 ML | Refills: 0 | Status: SHIPPED | OUTPATIENT
Start: 2025-03-20 | End: 2025-03-30

## 2025-03-20 NOTE — PROGRESS NOTES
Identified pt with two pt identifiers(name and ). Reviewed record in preparation for visit and have obtained necessary documentation. All patient medications has been reviewed.  Chief Complaint   Patient presents with    Follow-up     Mixed conductive and sensorineural hearing loss of both ears       Vitals:    25 1033   BP: (!) 140/79   Pulse: 66   SpO2: 99%                   Coordination of Care Questionnaire:   1) Have you been to an emergency room, urgent care, or hospitalized since your last visit?   no       2. Have seen or consulted any other health care provider since your last visit? no        Patient is accompanied by self I have received verbal consent from Miguelito Ascencio to discuss any/all medical information while they are present in the room.

## 2025-03-20 NOTE — PROGRESS NOTES
Subjective:   Miguelito Ascencio   70 y.o.   1955     Refered by: No referring provider defined for this encounter.     New Patient Visit  Chief Compliant: middle ear effusion    History of Present Illness:  Miguelito Ascencio is a 70 y.o. male with past medical history of GERD, HLD, HTN, who presents today for evaluation of CODI and ETD.      Patient states over the last couple weeks he has been having clogging in his right ear as well as intermittent dizziness.  He states that the right ear will feel full and he states that it is impacted with wax.  He states he has tried many different methods to clean the ear however still having issues with hearing out of the ear.  He states that his left ear has been doing well.  He states that is not giving him any issues.  He denies any pain or drainage.     10/30/2023- Mr Ascencio returns today for follow up after being seen on 10/20/2023, with complaints of ear fullness and dizziness.  Dizziness is better but not completely resolved.  Patient continues to report mild dizziness as well as full sensation in both ears.  Denies any discharge.  Still endorses     11/17/23:   Status post 2 courses of steroids with persistent right middle ear effusion, effusion has been there for about approximately a month and a half now.    Patient's vertigo is persistent.  Describes vertigo as episodes of lightheadedness or instability lasting approximately 15 minutes.  Typically happens after standing up from a seated position or getting up from a recumbent position.  Can occasionally happen when leaning down.  Has noted that it started after starting his blood pressure medications.    Audiogram: Bilateral mild sloping to severe mixed hearing loss.  Bilateral flat type B temps.    1/2/2025:   Patient is once again complaining of hearing loss, particularly in the left ear.  Was last seen over 1 year ago.  Audiogram at that time showed bilateral mixed hearing loss with signs of middle ear

## 2025-06-30 ENCOUNTER — OFFICE VISIT (OUTPATIENT)
Dept: PRIMARY CARE CLINIC | Facility: CLINIC | Age: 70
End: 2025-06-30
Payer: MEDICARE

## 2025-06-30 VITALS
TEMPERATURE: 98 F | BODY MASS INDEX: 26.25 KG/M2 | SYSTOLIC BLOOD PRESSURE: 141 MMHG | WEIGHT: 167.6 LBS | RESPIRATION RATE: 16 BRPM | DIASTOLIC BLOOD PRESSURE: 81 MMHG | HEART RATE: 75 BPM | OXYGEN SATURATION: 98 %

## 2025-06-30 DIAGNOSIS — H35.9 RETINA DEGENERATION: ICD-10-CM

## 2025-06-30 DIAGNOSIS — C61 PROSTATE CANCER (HCC): ICD-10-CM

## 2025-06-30 DIAGNOSIS — R73.9 HYPERGLYCEMIA: ICD-10-CM

## 2025-06-30 DIAGNOSIS — Z00.00 MEDICARE ANNUAL WELLNESS VISIT, SUBSEQUENT: Primary | ICD-10-CM

## 2025-06-30 DIAGNOSIS — I10 PRIMARY HYPERTENSION: ICD-10-CM

## 2025-06-30 DIAGNOSIS — E78.5 HYPERLIPIDEMIA, UNSPECIFIED HYPERLIPIDEMIA TYPE: ICD-10-CM

## 2025-06-30 PROBLEM — R97.20 ELEVATED PSA: Status: RESOLVED | Noted: 2022-06-14 | Resolved: 2025-06-30

## 2025-06-30 PROCEDURE — 3079F DIAST BP 80-89 MM HG: CPT | Performed by: FAMILY MEDICINE

## 2025-06-30 PROCEDURE — 3077F SYST BP >= 140 MM HG: CPT | Performed by: FAMILY MEDICINE

## 2025-06-30 PROCEDURE — 1123F ACP DISCUSS/DSCN MKR DOCD: CPT | Performed by: FAMILY MEDICINE

## 2025-06-30 PROCEDURE — 1159F MED LIST DOCD IN RCRD: CPT | Performed by: FAMILY MEDICINE

## 2025-06-30 PROCEDURE — 4004F PT TOBACCO SCREEN RCVD TLK: CPT | Performed by: FAMILY MEDICINE

## 2025-06-30 PROCEDURE — G8427 DOCREV CUR MEDS BY ELIG CLIN: HCPCS | Performed by: FAMILY MEDICINE

## 2025-06-30 PROCEDURE — G8419 CALC BMI OUT NRM PARAM NOF/U: HCPCS | Performed by: FAMILY MEDICINE

## 2025-06-30 PROCEDURE — G0439 PPPS, SUBSEQ VISIT: HCPCS | Performed by: FAMILY MEDICINE

## 2025-06-30 PROCEDURE — 3017F COLORECTAL CA SCREEN DOC REV: CPT | Performed by: FAMILY MEDICINE

## 2025-06-30 PROCEDURE — 99214 OFFICE O/P EST MOD 30 MIN: CPT | Performed by: FAMILY MEDICINE

## 2025-06-30 SDOH — ECONOMIC STABILITY: FOOD INSECURITY: WITHIN THE PAST 12 MONTHS, THE FOOD YOU BOUGHT JUST DIDN'T LAST AND YOU DIDN'T HAVE MONEY TO GET MORE.: NEVER TRUE

## 2025-06-30 SDOH — ECONOMIC STABILITY: FOOD INSECURITY: WITHIN THE PAST 12 MONTHS, YOU WORRIED THAT YOUR FOOD WOULD RUN OUT BEFORE YOU GOT MONEY TO BUY MORE.: NEVER TRUE

## 2025-06-30 ASSESSMENT — PATIENT HEALTH QUESTIONNAIRE - PHQ9
2. FEELING DOWN, DEPRESSED OR HOPELESS: NOT AT ALL
SUM OF ALL RESPONSES TO PHQ QUESTIONS 1-9: 0
1. LITTLE INTEREST OR PLEASURE IN DOING THINGS: NOT AT ALL

## 2025-06-30 NOTE — PATIENT INSTRUCTIONS
healthy for you. Talk to your doctor if you need help losing weight.     Try to get 7 to 9 hours of sleep each night.     Limit alcohol to 2 drinks a day for men and 1 drink a day for women. Too much alcohol can cause health problems.     Manage other health problems such as diabetes, high blood pressure, and high cholesterol. If you think you may have a problem with alcohol or drug use, talk to your doctor.   Medicines    Take your medicines exactly as prescribed. Call your doctor if you think you are having a problem with your medicine.     If your doctor recommends aspirin, take the amount directed each day. Make sure you take aspirin and not another kind of pain reliever, such as acetaminophen (Tylenol).   When should you call for help?   Call 911 if you have symptoms of a heart attack. These may include:    Chest pain or pressure, or a strange feeling in the chest.     Sweating.     Shortness of breath.     Pain, pressure, or a strange feeling in the back, neck, jaw, or upper belly or in one or both shoulders or arms.     Lightheadedness or sudden weakness.     A fast or irregular heartbeat.   After you call 911, the  may tell you to chew 1 adult-strength or 2 to 4 low-dose aspirin. Wait for an ambulance. Do not try to drive yourself.  Watch closely for changes in your health, and be sure to contact your doctor if you have any problems.  Where can you learn more?  Go to https://www.[employer].net/patientEd and enter F075 to learn more about \"A Healthy Heart: Care Instructions.\"  Current as of: July 31, 2024  Content Version: 14.5  © 2092-7205 [employer].   Care instructions adapted under license by [employer]. If you have questions about a medical condition or this instruction, always ask your healthcare professional. [employer], disclaims any warranty or liability for your use of this information.    Personalized Preventive Plan for Miguelito Ascencio - 6/30/2025  Medicare offers

## 2025-06-30 NOTE — PROGRESS NOTES
Medicare Annual Wellness Visit    Miguelito Ascencio is here for Medicare AWV    Assessment & Plan   Medicare annual wellness visit, subsequent  Prostate cancer (HCC)  Assessment & Plan:  Reviewed last urology note, recommended to have MRI of prostate.  Patient declines further intervention at this point.  Will obtain PSA to further advise recommendations.  Orders:  -     PSA, Diagnostic  Primary hypertension  Assessment & Plan:  Slightly elevated today, patient reports normal at home.  Will continue with lifestyle management  Orders:  -     CBC with Auto Differential  -     Comprehensive Metabolic Panel  Hyperlipidemia, unspecified hyperlipidemia type  Assessment & Plan:   Chronic, at goal (stable), continue current treatment plan  Orders:  -     Lipid Panel  Hyperglycemia  Comments:  Noted in past on labs, will recheck  Orders:  -     Hemoglobin A1C  Retina degeneration  Assessment & Plan:   Monitored by specialist- no acute findings meriting change in the plan     No follow-ups on file.     Subjective   The following acute and/or chronic problems were also addressed today:  History of Present Illness  The patient is a 70-year-old male with a history of hypertension, anemia, gastric reflux, hearing loss, hypercholesterolemia, retinal degeneration, and prostate cancer, who presents to the clinic today for follow-up.    He reports that the bumps on his arms, which were a concern during his last visit, have disappeared or reduced in size after applying lotion once a week.     He has not seen his urologist in over a year because he was informed that his prostate cancer would remain dormant for another 10 to 15 years, and he declined a second biopsy. Patient reports he will decline surgical intervention.    During his last visit, he expressed concerns about hearing loss, stating that he struggles to hear all words. However, he is not overly concerned about this issue today.    He has been taking omeprazole every other day

## 2025-06-30 NOTE — PROGRESS NOTES
Subjective  Chief Complaint   Patient presents with    Medicare AW     HPI:  Miguelito Ascencio is a 70 y.o. male with a history of hypertension, anemia, GERD, hearing loss, hypercholesterolemia, retina degeneration, and prostate cancer who presents to the clinic for a follow up with elevated blood pressure and no fever.    Actinic keratosis has receded because patient has applied lotion once a week.    Mr. Ascencio has not seen his urologist in over a year. Urologist said the cancer wasn't growing for 10-15 years. Patient had refused a second biopsy.     Patient says that he can't hear all the words but is not too concerned about his hearing and states that his hearing has not changed since the last visit.    Patient is asking about prevagen. Patient doesn't have much concerns about memory. Patient wants to re-enhance his memory.     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 Saw an ophthalmologist for retina and they have said that the degeneration was irreversible.     Mr. Ascencio takes omeprazole every other day about 2-3 years, used to take omperazole by mouth every day.     Mr. Ascencio is trying to adjust atorvastatin to take it at night he used to take this in the morning. He takes iron tablets every day for his anemia. Patient no longer takes losartan, did not have side effects.    Patient has someone who checks his blood pressure regularly, once a month. Mr. Ascencio is still taking multivitamins and vitamin D.     SOCIAL  He does not regularly exercise.     Past Medical History:   Diagnosis Date    Anemia     Cabral's palsy     \"as a baby\"    Borderline diabetes     GERD (gastroesophageal reflux disease)     Hearing loss     right    Heart murmur     History of blood transfusion     no reaction    Hypercholesterolemia     Hypertension     Prostate cancer (HCC)     Retina degeneration     pt states \"can't see anymore, retinas got ate up by parasites somehow\"     Current Outpatient Medications on File Prior to Visit   Medication Sig

## 2025-06-30 NOTE — ASSESSMENT & PLAN NOTE
Reviewed last urology note, recommended to have MRI of prostate.  Patient declines further intervention at this point.  Will obtain PSA just to trend to further advise recommendations.

## 2025-07-01 LAB
ALBUMIN SERPL-MCNC: 4.2 G/DL (ref 3.9–4.9)
ALP SERPL-CCNC: 98 IU/L (ref 44–121)
ALT SERPL-CCNC: 16 IU/L (ref 0–44)
AST SERPL-CCNC: 21 IU/L (ref 0–40)
BASOPHILS # BLD AUTO: 0 X10E3/UL (ref 0–0.2)
BASOPHILS NFR BLD AUTO: 1 %
BILIRUB SERPL-MCNC: 0.4 MG/DL (ref 0–1.2)
BUN SERPL-MCNC: 12 MG/DL (ref 8–27)
BUN/CREAT SERPL: 16 (ref 10–24)
CALCIUM SERPL-MCNC: 9.1 MG/DL (ref 8.6–10.2)
CHLORIDE SERPL-SCNC: 105 MMOL/L (ref 96–106)
CHOLEST SERPL-MCNC: 150 MG/DL (ref 100–199)
CO2 SERPL-SCNC: 19 MMOL/L (ref 20–29)
CREAT SERPL-MCNC: 0.75 MG/DL (ref 0.76–1.27)
EGFRCR SERPLBLD CKD-EPI 2021: 98 ML/MIN/1.73
EOSINOPHIL # BLD AUTO: 0.2 X10E3/UL (ref 0–0.4)
EOSINOPHIL NFR BLD AUTO: 2 %
ERYTHROCYTE [DISTWIDTH] IN BLOOD BY AUTOMATED COUNT: 12.7 % (ref 11.6–15.4)
GLOBULIN SER CALC-MCNC: 2.9 G/DL (ref 1.5–4.5)
GLUCOSE SERPL-MCNC: 113 MG/DL (ref 70–99)
HCT VFR BLD AUTO: 44.5 % (ref 37.5–51)
HDLC SERPL-MCNC: 47 MG/DL
HGB BLD-MCNC: 14.7 G/DL (ref 13–17.7)
IMM GRANULOCYTES # BLD AUTO: 0 X10E3/UL (ref 0–0.1)
IMM GRANULOCYTES NFR BLD AUTO: 0 %
LDLC SERPL CALC-MCNC: 81 MG/DL (ref 0–99)
LYMPHOCYTES # BLD AUTO: 1.6 X10E3/UL (ref 0.7–3.1)
LYMPHOCYTES NFR BLD AUTO: 21 %
MCH RBC QN AUTO: 31.3 PG (ref 26.6–33)
MCHC RBC AUTO-ENTMCNC: 33 G/DL (ref 31.5–35.7)
MCV RBC AUTO: 95 FL (ref 79–97)
MONOCYTES # BLD AUTO: 0.5 X10E3/UL (ref 0.1–0.9)
MONOCYTES NFR BLD AUTO: 7 %
NEUTROPHILS # BLD AUTO: 5.3 X10E3/UL (ref 1.4–7)
NEUTROPHILS NFR BLD AUTO: 69 %
PLATELET # BLD AUTO: 229 X10E3/UL (ref 150–450)
POTASSIUM SERPL-SCNC: 4.2 MMOL/L (ref 3.5–5.2)
PROT SERPL-MCNC: 7.1 G/DL (ref 6–8.5)
PSA SERPL-MCNC: 17.3 NG/ML (ref 0–4)
RBC # BLD AUTO: 4.69 X10E6/UL (ref 4.14–5.8)
SODIUM SERPL-SCNC: 140 MMOL/L (ref 134–144)
TRIGL SERPL-MCNC: 124 MG/DL (ref 0–149)
VLDLC SERPL CALC-MCNC: 22 MG/DL (ref 5–40)
WBC # BLD AUTO: 7.6 X10E3/UL (ref 3.4–10.8)

## 2025-07-03 LAB
EST. AVERAGE GLUCOSE BLD GHB EST-MCNC: 103 MG/DL
HBA1C MFR BLD: 5.2 %HB

## 2025-07-11 DIAGNOSIS — E78.5 HYPERLIPIDEMIA, UNSPECIFIED HYPERLIPIDEMIA TYPE: ICD-10-CM

## 2025-07-11 RX ORDER — ATORVASTATIN CALCIUM 40 MG/1
40 TABLET, FILM COATED ORAL DAILY
Qty: 90 TABLET | Refills: 3 | Status: SHIPPED | OUTPATIENT
Start: 2025-07-11

## (undated) LAB — PSA SERPL-MCNC: 16.6 NG/ML (ref 0–4)

## (undated) DEVICE — SYRINGE MEDICAL 3ML CLEAR PLASTIC STANDARD NON CONTROL LUERLOCK TIP DISPOSABLE

## (undated) DEVICE — Z INACTIVE USE 2854097 SPONGE GZ W4XL4IN COT 12 PLY TYP VII WVN C FLD DSGN

## (undated) DEVICE — KIT COLON W/ 1.1OZ LUB AND 2 END

## (undated) DEVICE — BOWL UTIL 16OZ STRL --

## (undated) DEVICE — MAX-CORE® DISPOSABLE CORE BIOPSY INSTRUMENT, 18G X 25CM: Brand: MAX-CORE

## (undated) DEVICE — 3M™ STERI-DRAPE™ INCISE DRAPE 1050 (60CM X 45CM): Brand: STERI-DRAPE™

## (undated) DEVICE — BITEBLOCK ENDOSCP 60FR MAXI WHT POLYETH STURDY W/ VELC WVN

## (undated) DEVICE — ELECTRODE,RADIOTRANSLUCENT,FOAM,3PK: Brand: MEDLINE

## (undated) DEVICE — BLUNTFILL WITH FILTER: Brand: MONOJECT

## (undated) DEVICE — CONTAINER,SPECIMEN,PNEU TUBE,4OZ,OR STRL: Brand: MEDLINE

## (undated) DEVICE — IV STRT KT 3282] LSL INDUSTRIES INC]

## (undated) DEVICE — ULNAR NERVE PROTECTOR FOAM POSITIONER: Brand: CARDINAL HEALTH

## (undated) DEVICE — MARKER SKIN 2 TIP UTIL W/ RULER REG LF

## (undated) DEVICE — SYRINGE MED 5ML STD CLR PLAS LUERLOCK TIP N CTRL DISP

## (undated) DEVICE — CANNULA CUSH AD W/ 14FT TBG

## (undated) DEVICE — SOLIDIFIER FLD 2OZ 1500CC N DISINF IN BTL DISP SAFESORB

## (undated) DEVICE — SOLUTION IRRIG 500ML STRL H2O NONPYROGENIC

## (undated) DEVICE — SYR 10ML LUER LOK 1/5ML GRAD --

## (undated) DEVICE — BLUNTFILL: Brand: MONOJECT

## (undated) DEVICE — NDL SPNE QNCKE 22GX7IN LF BLK --

## (undated) DEVICE — SET ADMIN 16ML TBNG L100IN 2 Y INJ SITE IV PIGGY BK DISP (ORDER IN MULIPLES OF 48)

## (undated) DEVICE — 1200 GUARD II KIT W/5MM TUBE W/O VAC TUBE: Brand: GUARDIAN

## (undated) DEVICE — COVER,MAYO STAND,STERILE: Brand: MEDLINE

## (undated) DEVICE — CATHETER IV 22GA L1IN OD0.8382-0.9144MM ID0.6096-0.6858MM 382523

## (undated) DEVICE — GLOVE SURG SZ 75 L12IN FNGR THK79MIL GRN LTX FREE

## (undated) DEVICE — PAD,NON-ADHERENT,3X8,STERILE,LF,1/PK: Brand: MEDLINE